# Patient Record
Sex: FEMALE | Race: WHITE | NOT HISPANIC OR LATINO | Employment: FULL TIME
[De-identification: names, ages, dates, MRNs, and addresses within clinical notes are randomized per-mention and may not be internally consistent; named-entity substitution may affect disease eponyms.]

---

## 2017-09-17 ENCOUNTER — HEALTH MAINTENANCE LETTER (OUTPATIENT)
Age: 22
End: 2017-09-17

## 2019-05-07 ENCOUNTER — NURSE TRIAGE (OUTPATIENT)
Dept: NURSING | Facility: CLINIC | Age: 24
End: 2019-05-07

## 2019-05-07 NOTE — TELEPHONE ENCOUNTER
iNlsa calls and says that she needs a copy of her immunization records. RN then gave pt. The phone # to Medical Records and pt. Says that she will call that # now.    Reason for Disposition    General information question, no triage required and triager able to answer question    Additional Information    Negative: [1] Caller is not with the adult (patient) AND [2] reporting urgent symptoms    Negative: Lab result questions    Negative: Medication questions    Negative: Caller can't be reached by phone    Negative: Caller has already spoken to PCP or another triager    Negative: RN needs further essential information from caller in order to complete triage    Negative: Requesting regular office appointment    Negative: [1] Caller requesting NON-URGENT health information AND [2] PCP's office is the best resource    Negative: Health Information question, no triage required and triager able to answer question    Protocols used: INFORMATION ONLY CALL-A-

## 2020-02-19 ENCOUNTER — HOSPITAL ENCOUNTER (EMERGENCY)
Facility: CLINIC | Age: 25
Discharge: HOME | End: 2020-02-19
Attending: EMERGENCY MEDICINE | Admitting: EMERGENCY MEDICINE

## 2020-02-19 VITALS
WEIGHT: 131 LBS | RESPIRATION RATE: 16 BRPM | TEMPERATURE: 98.1 F | HEART RATE: 78 BPM | DIASTOLIC BLOOD PRESSURE: 74 MMHG | BODY MASS INDEX: 20.83 KG/M2 | OXYGEN SATURATION: 100 % | SYSTOLIC BLOOD PRESSURE: 115 MMHG

## 2020-02-19 DIAGNOSIS — K62.5 RECTAL BLEEDING: ICD-10-CM

## 2020-02-19 LAB
BASOPHILS # BLD AUTO: 0 10E9/L (ref 0–0.2)
BASOPHILS NFR BLD AUTO: 0.2 %
DIFFERENTIAL METHOD BLD: NORMAL
EOSINOPHIL # BLD AUTO: 0.1 10E9/L (ref 0–0.7)
EOSINOPHIL NFR BLD AUTO: 0.8 %
ERYTHROCYTE [DISTWIDTH] IN BLOOD BY AUTOMATED COUNT: 11.9 % (ref 10–15)
HCT VFR BLD AUTO: 39.9 % (ref 35–47)
HGB BLD-MCNC: 13.5 G/DL (ref 11.7–15.7)
IMM GRANULOCYTES # BLD: 0 10E9/L (ref 0–0.4)
IMM GRANULOCYTES NFR BLD: 0.2 %
LYMPHOCYTES # BLD AUTO: 2.5 10E9/L (ref 0.8–5.3)
LYMPHOCYTES NFR BLD AUTO: 25.9 %
MCH RBC QN AUTO: 31 PG (ref 26.5–33)
MCHC RBC AUTO-ENTMCNC: 33.8 G/DL (ref 31.5–36.5)
MCV RBC AUTO: 92 FL (ref 78–100)
MONOCYTES # BLD AUTO: 0.5 10E9/L (ref 0–1.3)
MONOCYTES NFR BLD AUTO: 5.3 %
NEUTROPHILS # BLD AUTO: 6.5 10E9/L (ref 1.6–8.3)
NEUTROPHILS NFR BLD AUTO: 67.6 %
NRBC # BLD AUTO: 0 10*3/UL
NRBC BLD AUTO-RTO: 0 /100
PLATELET # BLD AUTO: 279 10E9/L (ref 150–450)
RBC # BLD AUTO: 4.36 10E12/L (ref 3.8–5.2)
WBC # BLD AUTO: 9.6 10E9/L (ref 4–11)

## 2020-02-19 PROCEDURE — 85025 COMPLETE CBC W/AUTO DIFF WBC: CPT | Performed by: EMERGENCY MEDICINE

## 2020-02-19 PROCEDURE — 99284 EMERGENCY DEPT VISIT MOD MDM: CPT | Mod: 25

## 2020-02-19 PROCEDURE — 99283 EMERGENCY DEPT VISIT LOW MDM: CPT | Mod: 25 | Performed by: EMERGENCY MEDICINE

## 2020-02-19 PROCEDURE — 46600 DIAGNOSTIC ANOSCOPY SPX: CPT

## 2020-02-19 PROCEDURE — 46600 DIAGNOSTIC ANOSCOPY SPX: CPT | Mod: Z6 | Performed by: EMERGENCY MEDICINE

## 2020-02-19 ASSESSMENT — ENCOUNTER SYMPTOMS
FEVER: 0
CONSTIPATION: 0
ANAL BLEEDING: 1
RECTAL PAIN: 1

## 2020-02-19 NOTE — DISCHARGE INSTRUCTIONS
Please make an appointment to follow up with [facility] (phone: [phone]).    Proctofoam as directed.    Eat a high-fiber diet and consider fiber supplementation with Metamucil.    Return to the emergency department for high fevers, severe pain, worsening bleeding, or any other problems.    
89 F Presenting with cough, dyspnea found to be profoundly dry with metabolic alkalosis concerning for contraction alkalosis, also found to have leukocytosis concerning for underlying infection as a likely inciting factor, without risk factors for resistant organisms.

## 2020-02-19 NOTE — ED AVS SNAPSHOT
[facility], [place], Emergency Department  [address]  [facility] [address]  Phone:  [phone]  Fax:  [phone]                                    Nilsa Goldman   MRN: [id number]    Department:  [facility], Emergency Department   Date of Visit:  2/19/2020           After Visit Summary Signature Page    I have received my discharge instructions, and my questions have been answered. I have discussed any challenges I see with this plan with the nurse or doctor.    ..........................................................................................................................................  Patient/Patient Representative Signature      ..........................................................................................................................................  Patient Representative Print Name and Relationship to Patient    ..................................................               ................................................  Date                                   Time    ..........................................................................................................................................  Reviewed by Signature/Title    ...................................................              ..............................................  Date                                               Time          22EPIC Rev 08/18

## 2020-02-19 NOTE — ED PROVIDER NOTES
[facility] [facility]    2/19/20       History     Chief Complaint   Patient presents with     Rectal Problem     pt had anal fiussure last year related to allerguic reaction, anus has been irritated for past month and today large amt of blood.     The history is provided by the patient.     Nilsa Goldman is a 24 year old female with a medical history significant for a prior anal fissure (2019) who presents to the Emergency Department for evaluation of anal bleeding and rectal pain with a bowel movement today.  The patient reports that she has had some mild discomfort for the past month with bowel movements, patient has been concerned that she may have another anal fissure.  The patient today had a bowel movement and had an excessive amount of anal bleeding and she noted some rectal pain associated with the bowel movement.  The patient reports that the bleeding was to the amount that she was having her menstrual period again, which she just had.  She denies any fevers and she denies any recent constipation or straining to have a bowel movement.  The patient reports that her symptoms feel similar to when the anal fissure was diagnosed last year, but the pain last year was sharper and more severe.  The patient reports that prior anal fissure was initially treated with home remedies, but this did not resolve the issue, so she was seen by a colorectal specialist and was treated with an ointment.  The patient does not remember the name of this ointment and we do not have access to these records.    I have reviewed the Medications, Allergies, Past Medical and Surgical History, and Social History in the [employer] system.    Past Medical History:   Diagnosis Date     NO ACTIVE PROBLEMS        Past Surgical History:   Procedure Laterality Date     NO HISTORY OF SURGERY         Family History   Problem Relation Age of Onset     Family History Negative Mother         alive 43     Family History  Negative Father         alive 43     Family History Negative Sister         alive 9     Family History Negative Brother         half brother-alive 19 from dad     Family History Negative Maternal Grandmother              Cardiovascular Maternal Grandfather         -heart attck at age of 56     Family History Negative Paternal Grandmother         alive     Heart Disease Paternal Grandmother      Family History Negative Paternal Grandfather         alive     Diabetes No family hx of        Social History     Tobacco Use     Smoking status: Never Smoker     Smokeless tobacco: Never Used     Tobacco comment: mom and dad smokes   Substance Use Topics     Alcohol use: No       No current facility-administered medications for this encounter.      Current Outpatient Medications   Medication     pramoxine 1 % RE foam     NO ACTIVE MEDICATIONS        Allergies   Allergen Reactions     Cephalexin GI Disturbance     Resulted in anal fissure.       Penicillin G      Rash, hives         Review of Systems   Constitutional: Negative for fever.   Gastrointestinal: Positive for anal bleeding and rectal pain. Negative for constipation.   All other systems reviewed and are negative.      Physical Exam   BP: 124/78  Pulse: 78  Temp: 97.8  F (36.6  C)  Resp: 14  Weight: 59.4 kg (131 lb)  SpO2: 98 %      Physical Exam  Vitals signs and nursing note reviewed.   Constitutional:       General: She is not in acute distress.     Appearance: She is well-developed. She is not ill-appearing or toxic-appearing.   HENT:      Head: Normocephalic and atraumatic.   Eyes:      General: No scleral icterus.     Pupils: Pupils are equal, round, and reactive to light.   Neck:      Musculoskeletal: Normal range of motion and neck supple.   Cardiovascular:      Rate and Rhythm: Normal rate.   Pulmonary:      Effort: Pulmonary effort is normal. No respiratory distress.   Abdominal:      General: Abdomen is flat. There is no distension.       Tenderness: There is no abdominal tenderness. There is no guarding or rebound.   Genitourinary:     Rectum: No tenderness, anal fissure, external hemorrhoid or internal hemorrhoid.   Skin:     General: Skin is warm and dry.      Coloration: Skin is not pale.      Findings: No rash.   Neurological:      Mental Status: She is alert and oriented to person, place, and time.      Sensory: No sensory deficit.   Psychiatric:         Behavior: Behavior normal.         ED Course   1:50 PM  The patient was seen and examined by Felton Rodríguez MD in Room ED06.        Procedures        Results for orders placed or performed during the hospital encounter of 02/19/20   CBC with platelets differential     Status: None   Result Value Ref Range    WBC 9.6 4.0 - 11.0 10e9/L    RBC Count 4.36 3.8 - 5.2 10e12/L    Hemoglobin 13.5 11.7 - 15.7 g/dL    Hematocrit 39.9 35.0 - 47.0 %    MCV 92 78 - 100 fl    MCH 31.0 26.5 - 33.0 pg    MCHC 33.8 31.5 - 36.5 g/dL    RDW 11.9 10.0 - 15.0 %    Platelet Count 279 150 - 450 10e9/L    Diff Method Automated Method     % Neutrophils 67.6 %    % Lymphocytes 25.9 %    % Monocytes 5.3 %    % Eosinophils 0.8 %    % Basophils 0.2 %    % Immature Granulocytes 0.2 %    Nucleated RBCs 0 0 /100    Absolute Neutrophil 6.5 1.6 - 8.3 10e9/L    Absolute Lymphocytes 2.5 0.8 - 5.3 10e9/L    Absolute Monocytes 0.5 0.0 - 1.3 10e9/L    Absolute Eosinophils 0.1 0.0 - 0.7 10e9/L    Absolute Basophils 0.0 0.0 - 0.2 10e9/L    Abs Immature Granulocytes 0.0 0 - 0.4 10e9/L    Absolute Nucleated RBC 0.0           Assessments & Plan (with Medical Decision Making)   This patient presented emergency department for one episode of rectal bleeding.  She has a past history of anal fissure.  She is having some anal discomfort but on exam did not have an obvious fissure.  Anoscopy was performed and there was some mucosal irritation but I did not appreciate any active bleeding or obvious large fissure.  Abdominal exam is benign,  hemoglobin is stable and vital signs are within normal limits all decreasing suspicion for significant GI bleed.  She has had no episodes of bleeding in the emergency department and no blood was noted on rectal exam.  This point time I am comfortable starting the patient on treatment with Proctofoam for the anal discomfort and having her follow-up with the colorectal specialist that she had seen before.  She was also provided with the phone number for the colorectal clinic at the [facility] and told to return should symptoms worsen or she develop any other symptoms in the interim.  This part of the medical record was transcribed by Brendan Owusu Medical Scribe, from a dictation done by Remi Rodríguez MD.     I have reviewed the nursing notes.    I have reviewed the findings, diagnosis, plan and need for follow up with the patient.    Discharge Medication List as of 2/19/2020  3:24 PM      START taking these medications    Details   pramoxine 1 % RE foam Place 1 g rectally every 4 hours as needed (rectal irritation and after bowel movements)Disp-60 g, R-0Local Print             Final diagnoses:   Rectal bleeding     IMauricio, am serving as a trained medical scribe to document services personally performed by Felton Rodríguez MD, based on the provider's statements to me.     I, Felton Rodríguez MD, was physically present and have reviewed and verified the accuracy of this note documented by Mauricio Lee.    2/19/2020   [facility], EMERGENCY DEPARTMENT     Felton Rodríguez MD  02/21/20 4418

## 2021-12-26 ENCOUNTER — ANESTHESIA EVENT (OUTPATIENT)
Dept: OBGYN | Facility: CLINIC | Age: 26
End: 2021-12-26
Payer: OTHER GOVERNMENT

## 2021-12-26 ENCOUNTER — ANESTHESIA (OUTPATIENT)
Dept: OBGYN | Facility: CLINIC | Age: 26
End: 2021-12-26
Payer: OTHER GOVERNMENT

## 2021-12-26 ENCOUNTER — HOSPITAL ENCOUNTER (INPATIENT)
Facility: CLINIC | Age: 26
LOS: 3 days | Discharge: HOME HEALTH | End: 2021-12-29
Attending: ADVANCED PRACTICE MIDWIFE | Admitting: ADVANCED PRACTICE MIDWIFE
Payer: OTHER GOVERNMENT

## 2021-12-26 DIAGNOSIS — Z98.891 S/P CESAREAN SECTION: Primary | ICD-10-CM

## 2021-12-26 PROBLEM — Z34.90 PREGNANCY WITH PRENATAL CARE ELSEWHERE: Status: ACTIVE | Noted: 2021-12-26

## 2021-12-26 LAB
ABO/RH(D): ABNORMAL
ANTIBODY ID: NORMAL
ANTIBODY SCREEN: POSITIVE
BASOPHILS # BLD AUTO: 0 10E3/UL (ref 0–0.2)
BASOPHILS NFR BLD AUTO: 0 %
EOSINOPHIL # BLD AUTO: 0.1 10E3/UL (ref 0–0.7)
EOSINOPHIL NFR BLD AUTO: 1 %
ERYTHROCYTE [DISTWIDTH] IN BLOOD BY AUTOMATED COUNT: 13.2 % (ref 10–15)
GLUCOSE BLDC GLUCOMTR-MCNC: 134 MG/DL (ref 70–99)
GLUCOSE BLDC GLUCOMTR-MCNC: 84 MG/DL (ref 70–99)
HCT VFR BLD AUTO: 38 % (ref 35–47)
HGB BLD-MCNC: 12.4 G/DL (ref 11.7–15.7)
IMM GRANULOCYTES # BLD: 0.1 10E3/UL
IMM GRANULOCYTES NFR BLD: 1 %
LYMPHOCYTES # BLD AUTO: 2.3 10E3/UL (ref 0.8–5.3)
LYMPHOCYTES NFR BLD AUTO: 17 %
MCH RBC QN AUTO: 29.9 PG (ref 26.5–33)
MCHC RBC AUTO-ENTMCNC: 32.6 G/DL (ref 31.5–36.5)
MCV RBC AUTO: 92 FL (ref 78–100)
MONOCYTES # BLD AUTO: 0.6 10E3/UL (ref 0–1.3)
MONOCYTES NFR BLD AUTO: 5 %
NEUTROPHILS # BLD AUTO: 10.2 10E3/UL (ref 1.6–8.3)
NEUTROPHILS NFR BLD AUTO: 76 %
NRBC # BLD AUTO: 0 10E3/UL
NRBC BLD AUTO-RTO: 0 /100
PLATELET # BLD AUTO: 279 10E3/UL (ref 150–450)
RBC # BLD AUTO: 4.15 10E6/UL (ref 3.8–5.2)
SARS-COV-2 RNA RESP QL NAA+PROBE: NEGATIVE
SPECIMEN EXPIRATION DATE: ABNORMAL
SPECIMEN EXPIRATION DATE: NORMAL
WBC # BLD AUTO: 13.3 10E3/UL (ref 4–11)

## 2021-12-26 PROCEDURE — 86780 TREPONEMA PALLIDUM: CPT | Performed by: ADVANCED PRACTICE MIDWIFE

## 2021-12-26 PROCEDURE — 86920 COMPATIBILITY TEST SPIN: CPT | Performed by: STUDENT IN AN ORGANIZED HEALTH CARE EDUCATION/TRAINING PROGRAM

## 2021-12-26 PROCEDURE — 120N000002 HC R&B MED SURG/OB UMMC

## 2021-12-26 PROCEDURE — 86870 RBC ANTIBODY IDENTIFICATION: CPT | Performed by: ADVANCED PRACTICE MIDWIFE

## 2021-12-26 PROCEDURE — 250N000011 HC RX IP 250 OP 636

## 2021-12-26 PROCEDURE — 250N000013 HC RX MED GY IP 250 OP 250 PS 637: Performed by: REGISTERED NURSE

## 2021-12-26 PROCEDURE — 86850 RBC ANTIBODY SCREEN: CPT | Performed by: ADVANCED PRACTICE MIDWIFE

## 2021-12-26 PROCEDURE — 87635 SARS-COV-2 COVID-19 AMP PRB: CPT | Performed by: ADVANCED PRACTICE MIDWIFE

## 2021-12-26 PROCEDURE — 250N000009 HC RX 250: Performed by: ADVANCED PRACTICE MIDWIFE

## 2021-12-26 PROCEDURE — 250N000009 HC RX 250: Performed by: ANESTHESIOLOGY

## 2021-12-26 PROCEDURE — 258N000003 HC RX IP 258 OP 636: Performed by: ADVANCED PRACTICE MIDWIFE

## 2021-12-26 PROCEDURE — 85025 COMPLETE CBC W/AUTO DIFF WBC: CPT | Performed by: ADVANCED PRACTICE MIDWIFE

## 2021-12-26 RX ORDER — FENTANYL CITRATE-0.9 % NACL/PF 10 MCG/ML
100 PLASTIC BAG, INJECTION (ML) INTRAVENOUS EVERY 5 MIN PRN
Status: DISCONTINUED | OUTPATIENT
Start: 2021-12-26 | End: 2021-12-27 | Stop reason: HOSPADM

## 2021-12-26 RX ORDER — CARBOPROST TROMETHAMINE 250 UG/ML
250 INJECTION, SOLUTION INTRAMUSCULAR
Status: DISCONTINUED | OUTPATIENT
Start: 2021-12-26 | End: 2021-12-27 | Stop reason: HOSPADM

## 2021-12-26 RX ORDER — PROCHLORPERAZINE MALEATE 10 MG
10 TABLET ORAL EVERY 6 HOURS PRN
Status: DISCONTINUED | OUTPATIENT
Start: 2021-12-26 | End: 2021-12-27 | Stop reason: HOSPADM

## 2021-12-26 RX ORDER — METOCLOPRAMIDE HYDROCHLORIDE 5 MG/ML
10 INJECTION INTRAMUSCULAR; INTRAVENOUS EVERY 6 HOURS PRN
Status: DISCONTINUED | OUTPATIENT
Start: 2021-12-26 | End: 2021-12-27 | Stop reason: HOSPADM

## 2021-12-26 RX ORDER — NALBUPHINE HYDROCHLORIDE 10 MG/ML
2.5-5 INJECTION, SOLUTION INTRAMUSCULAR; INTRAVENOUS; SUBCUTANEOUS EVERY 6 HOURS PRN
Status: DISCONTINUED | OUTPATIENT
Start: 2021-12-26 | End: 2021-12-27

## 2021-12-26 RX ORDER — FENTANYL CITRATE-0.9 % NACL/PF 10 MCG/ML
PLASTIC BAG, INJECTION (ML) INTRAVENOUS
Status: DISCONTINUED
Start: 2021-12-26 | End: 2021-12-27 | Stop reason: HOSPADM

## 2021-12-26 RX ORDER — PRENATAL VIT/IRON FUM/FOLIC AC 27MG-0.8MG
1 TABLET ORAL DAILY
COMMUNITY
End: 2023-11-16

## 2021-12-26 RX ORDER — TRANEXAMIC ACID 10 MG/ML
1 INJECTION, SOLUTION INTRAVENOUS EVERY 30 MIN PRN
Status: DISCONTINUED | OUTPATIENT
Start: 2021-12-26 | End: 2021-12-27 | Stop reason: HOSPADM

## 2021-12-26 RX ORDER — NALOXONE HYDROCHLORIDE 0.4 MG/ML
0.4 INJECTION, SOLUTION INTRAMUSCULAR; INTRAVENOUS; SUBCUTANEOUS
Status: DISCONTINUED | OUTPATIENT
Start: 2021-12-26 | End: 2021-12-27 | Stop reason: HOSPADM

## 2021-12-26 RX ORDER — CALCIUM CARBONATE 500 MG/1
500 TABLET, CHEWABLE ORAL 3 TIMES DAILY PRN
Status: DISCONTINUED | OUTPATIENT
Start: 2021-12-26 | End: 2021-12-27

## 2021-12-26 RX ORDER — FENTANYL/ROPIVACAINE/NS/PF 2MCG/ML-.1
PLASTIC BAG, INJECTION (ML) EPIDURAL
Status: COMPLETED
Start: 2021-12-26 | End: 2021-12-26

## 2021-12-26 RX ORDER — OXYTOCIN/0.9 % SODIUM CHLORIDE 30/500 ML
340 PLASTIC BAG, INJECTION (ML) INTRAVENOUS CONTINUOUS PRN
Status: DISCONTINUED | OUTPATIENT
Start: 2021-12-26 | End: 2021-12-27 | Stop reason: HOSPADM

## 2021-12-26 RX ORDER — FENTANYL CITRATE 50 UG/ML
50-100 INJECTION, SOLUTION INTRAMUSCULAR; INTRAVENOUS
Status: DISCONTINUED | OUTPATIENT
Start: 2021-12-26 | End: 2021-12-27 | Stop reason: HOSPADM

## 2021-12-26 RX ORDER — SODIUM CHLORIDE, SODIUM LACTATE, POTASSIUM CHLORIDE, CALCIUM CHLORIDE 600; 310; 30; 20 MG/100ML; MG/100ML; MG/100ML; MG/100ML
INJECTION, SOLUTION INTRAVENOUS CONTINUOUS PRN
Status: DISCONTINUED | OUTPATIENT
Start: 2021-12-26 | End: 2021-12-27 | Stop reason: HOSPADM

## 2021-12-26 RX ORDER — ONDANSETRON 4 MG/1
4 TABLET, ORALLY DISINTEGRATING ORAL EVERY 6 HOURS PRN
Status: DISCONTINUED | OUTPATIENT
Start: 2021-12-26 | End: 2021-12-27 | Stop reason: HOSPADM

## 2021-12-26 RX ORDER — KETOROLAC TROMETHAMINE 30 MG/ML
30 INJECTION, SOLUTION INTRAMUSCULAR; INTRAVENOUS
Status: DISCONTINUED | OUTPATIENT
Start: 2021-12-26 | End: 2021-12-27

## 2021-12-26 RX ORDER — OXYTOCIN/0.9 % SODIUM CHLORIDE 30/500 ML
100-340 PLASTIC BAG, INJECTION (ML) INTRAVENOUS CONTINUOUS PRN
Status: DISCONTINUED | OUTPATIENT
Start: 2021-12-26 | End: 2021-12-27

## 2021-12-26 RX ORDER — NALOXONE HYDROCHLORIDE 0.4 MG/ML
0.2 INJECTION, SOLUTION INTRAMUSCULAR; INTRAVENOUS; SUBCUTANEOUS
Status: DISCONTINUED | OUTPATIENT
Start: 2021-12-26 | End: 2021-12-27 | Stop reason: HOSPADM

## 2021-12-26 RX ORDER — OXYTOCIN 10 [USP'U]/ML
10 INJECTION, SOLUTION INTRAMUSCULAR; INTRAVENOUS
Status: DISCONTINUED | OUTPATIENT
Start: 2021-12-26 | End: 2021-12-27

## 2021-12-26 RX ORDER — IBUPROFEN 600 MG/1
600 TABLET, FILM COATED ORAL
Status: DISCONTINUED | OUTPATIENT
Start: 2021-12-26 | End: 2021-12-27

## 2021-12-26 RX ORDER — PROCHLORPERAZINE 25 MG
25 SUPPOSITORY, RECTAL RECTAL EVERY 12 HOURS PRN
Status: DISCONTINUED | OUTPATIENT
Start: 2021-12-26 | End: 2021-12-27 | Stop reason: HOSPADM

## 2021-12-26 RX ORDER — ONDANSETRON 2 MG/ML
4 INJECTION INTRAMUSCULAR; INTRAVENOUS EVERY 6 HOURS PRN
Status: DISCONTINUED | OUTPATIENT
Start: 2021-12-26 | End: 2021-12-27 | Stop reason: HOSPADM

## 2021-12-26 RX ORDER — MISOPROSTOL 200 UG/1
800 TABLET ORAL
Status: DISCONTINUED | OUTPATIENT
Start: 2021-12-26 | End: 2021-12-27 | Stop reason: HOSPADM

## 2021-12-26 RX ORDER — FENTANYL/ROPIVACAINE/NS/PF 2MCG/ML-.1
PLASTIC BAG, INJECTION (ML) EPIDURAL
Status: DISCONTINUED | OUTPATIENT
Start: 2021-12-26 | End: 2021-12-27 | Stop reason: HOSPADM

## 2021-12-26 RX ORDER — METHYLERGONOVINE MALEATE 0.2 MG/ML
200 INJECTION INTRAVENOUS
Status: DISCONTINUED | OUTPATIENT
Start: 2021-12-26 | End: 2021-12-27 | Stop reason: HOSPADM

## 2021-12-26 RX ORDER — OXYTOCIN/0.9 % SODIUM CHLORIDE 30/500 ML
1-24 PLASTIC BAG, INJECTION (ML) INTRAVENOUS CONTINUOUS
Status: DISCONTINUED | OUTPATIENT
Start: 2021-12-26 | End: 2021-12-27 | Stop reason: HOSPADM

## 2021-12-26 RX ORDER — MISOPROSTOL 200 UG/1
400 TABLET ORAL
Status: DISCONTINUED | OUTPATIENT
Start: 2021-12-26 | End: 2021-12-27 | Stop reason: HOSPADM

## 2021-12-26 RX ORDER — LIDOCAINE 40 MG/G
CREAM TOPICAL
Status: DISCONTINUED | OUTPATIENT
Start: 2021-12-26 | End: 2021-12-27 | Stop reason: HOSPADM

## 2021-12-26 RX ORDER — OXYTOCIN 10 [USP'U]/ML
10 INJECTION, SOLUTION INTRAMUSCULAR; INTRAVENOUS
Status: DISCONTINUED | OUTPATIENT
Start: 2021-12-26 | End: 2021-12-27 | Stop reason: HOSPADM

## 2021-12-26 RX ORDER — METOCLOPRAMIDE 10 MG/1
10 TABLET ORAL EVERY 6 HOURS PRN
Status: DISCONTINUED | OUTPATIENT
Start: 2021-12-26 | End: 2021-12-27 | Stop reason: HOSPADM

## 2021-12-26 RX ADMIN — Medication: at 21:31

## 2021-12-26 RX ADMIN — CALCIUM CARBONATE (ANTACID) CHEW TAB 500 MG 500 MG: 500 CHEW TAB at 21:39

## 2021-12-26 RX ADMIN — Medication 2 MILLI-UNITS/MIN: at 13:44

## 2021-12-26 RX ADMIN — SODIUM CHLORIDE, POTASSIUM CHLORIDE, SODIUM LACTATE AND CALCIUM CHLORIDE 500 ML: 600; 310; 30; 20 INJECTION, SOLUTION INTRAVENOUS at 20:12

## 2021-12-26 RX ADMIN — Medication 4 ML: at 21:10

## 2021-12-26 RX ADMIN — SODIUM CHLORIDE, POTASSIUM CHLORIDE, SODIUM LACTATE AND CALCIUM CHLORIDE 125 ML/HR: 600; 310; 30; 20 INJECTION, SOLUTION INTRAVENOUS at 13:50

## 2021-12-26 RX ADMIN — Medication 3 ML: at 21:05

## 2021-12-26 RX ADMIN — SODIUM CHLORIDE, POTASSIUM CHLORIDE, SODIUM LACTATE AND CALCIUM CHLORIDE: 600; 310; 30; 20 INJECTION, SOLUTION INTRAVENOUS at 22:02

## 2021-12-26 NOTE — PLAN OF CARE
Induction of Labor Admit Note  Nilsa Goldman  MRN: [id number]  Gestational Age: 41w6d      Nilsa Goldman presents for induction of labor for prolonged rupture of membranes and post dates.  Patient complains of infrequent contractions at this time, leaking small amounts of greenish/brown fluid and denies bleeding.   Past Medical History:   Diagnosis Date    NO ACTIVE PROBLEMS      Induction Plan: oxytocin and continuous monitoring    Imtiaz Sapp CNM notified of patient's arrival and condition.   Oriented patient to surroundings. Call light within reach.     Oxytocin started at 1345 and will increase per order set.     Per Imtiaz Sapp CNM, records indicate that pts' BG were slightly elevated per [facility] parameters so RNs will check BG 2hrs PP X3. Will monitor patient closely and will notify provider with any concerns.

## 2021-12-26 NOTE — H&P
ADMIT NOTE  =================  41w6d    Nilsa Goldman is a 26 year old female with an Patient's last menstrual period was 2021. and Estimated Date of Delivery: Dec 13, 2021 [facility] at 12:49 PM with for induction of labor.  Indication: SROM without labor, ROM x 39 hours.     HPI  ================  Patient with planned Out of Hospital Birth is transferring by car to the hospital for prolonged ROM with no labor. Pt with SROM at 2200 on 21, light meconium.  Irregular contractions, occasionally uncomfortable. Now reports uterine cramping.      Patient has been seen by [facility] for prenatal care.  Transferring midwife name(s),/birth center & phone number: [facility]  Midwife here supporting patient: no  Records received, reviewed and scanned into chart. yes  M Tapit SBAR transfer tool was used: 50%    Contractions- cramping  Fetal movement- active  ROM- yes moderate, meconium. SROM @ 2200 on 21   Vaginal bleeding- none  GBS- negative  FOB- is involved, Igor  Other labor support- mother,     Weight gain- 183 - 132 lbs, Total weight gain- 51 lbs  Height- 68  BMI- 20  First prenatal visit at 6 weeks, Total visits- 13  Initiated at [facility] at 6 weeks for 2 visits, JUAN ANTONIO to [place] at 13w    PROBLEM LIST  =================  Patient Active Problem List    Diagnosis Date Noted     Labor and delivery, indication for care 2021     Priority: Medium     Pregnancy with prenatal care elsewhere 2021     Priority: Medium     Patient with planned Out of Hospital Birth is transferring by car to the hospital for PROM  Patient has been seen by [facility] for prenatal care.  Transferring midwife name(s),/birth center & phone number: [facility], [phone]  Midwife here supporting patient: no  Records received, reviewed and scanned into chart. yes  M Tapit SBAR transfer tool was used: 50%         Undersocialized conduct disorder, aggressive type  2008     Priority: Medium     anger issues  Problem list name updated by automated process. Provider to review       HISTORIES  ============  Allergies   Allergen Reactions     Cephalexin GI Disturbance     Resulted in anal fissure.       Penicillin G      Rash, hives     Past Medical History:   Diagnosis Date     NO ACTIVE PROBLEMS      Past Surgical History:   Procedure Laterality Date     NO HISTORY OF SURGERY       WISDOM TOOTH EXTRACTION Bilateral    .  Family History   Problem Relation Age of Onset     Family History Negative Mother         alive 43     Family History Negative Father         alive 43     Family History Negative Sister         alive 9     Family History Negative Brother         half brother-alive 19 from dad     Family History Negative Maternal Grandmother              Cardiovascular Maternal Grandfather         -heart attck at age of 56     Family History Negative Paternal Grandmother         alive     Heart Disease Paternal Grandmother      Family History Negative Paternal Grandfather         alive     Diabetes No family hx of      Social History     Tobacco Use     Smoking status: Never Smoker     Smokeless tobacco: Never Used     Tobacco comment: mom and dad smokes   Substance Use Topics     Alcohol use: No     OB History    Para Term  AB Living   1 0 0 0 0 0   SAB IAB Ectopic Multiple Live Births   0 0 0 0 0      # Outcome Date GA Lbr Jose/2nd Weight Sex Delivery Anes PTL Lv   1 Current               LABS:   ===========  Prenatal Labs reviewed per transfer records:   Rhogam indicated and given on 21  ABO/ RH: neg  Rubella immune   HBsAg: negative   HIV: negative   RPR: NR   GCT: 134  (within normal range for BC)  GBS: neg, collected 21  HEP C: neg    Lab Results   Component Value Date    HGB 12.4 2021     Other labs:  Results for orders placed or performed during the hospital encounter of 21 (from the past 24 hour(s))   ABO/Rh type and  screen    Narrative    The following orders were created for panel order ABO/Rh type and screen.  Procedure                               Abnormality         Status                     ---------                               -----------         ------                     Adult Type and Screen[[id number]]                            In process                   Please view results for these tests on the individual orders.   CBC with platelets differential    Narrative    The following orders were created for panel order CBC with platelets differential.  Procedure                               Abnormality         Status                     ---------                               -----------         ------                     CBC with platelets and d...[[id number]]  Abnormal            Final result                 Please view results for these tests on the individual orders.   CBC with platelets and differential   Result Value Ref Range    WBC Count 13.3 (H) 4.0 - 11.0 10e3/uL    RBC Count 4.15 3.80 - 5.20 10e6/uL    Hemoglobin 12.4 11.7 - 15.7 g/dL    Hematocrit 38.0 35.0 - 47.0 %    MCV 92 78 - 100 fL    MCH 29.9 26.5 - 33.0 pg    MCHC 32.6 31.5 - 36.5 g/dL    RDW 13.2 10.0 - 15.0 %    Platelet Count 279 150 - 450 10e3/uL    % Neutrophils 76 %    % Lymphocytes 17 %    % Monocytes 5 %    % Eosinophils 1 %    % Basophils 0 %    % Immature Granulocytes 1 %    NRBCs per 100 WBC 0 <1 /100    Absolute Neutrophils 10.2 (H) 1.6 - 8.3 10e3/uL    Absolute Lymphocytes 2.3 0.8 - 5.3 10e3/uL    Absolute Monocytes 0.6 0.0 - 1.3 10e3/uL    Absolute Eosinophils 0.1 0.0 - 0.7 10e3/uL    Absolute Basophils 0.0 0.0 - 0.2 10e3/uL    Absolute Immature Granulocytes 0.1 <=0.4 10e3/uL    Absolute NRBCs 0.0 10e3/uL       ULTRASOUND  =============  4/22/21 dating us c/w LMP (BRANDY 12/13/21)  8/5/21, result normal level II     ROS  =========  Pt denies significant respiratory, cardiovacular, GI, or muscular/skeletalcomplaints.    See RN data base  ROS.     PHYSICAL EXAM:  ===============  /75   Temp 97.4  F (36.3  C) (Oral)   Resp 18   LMP 03/08/2021   General appearance: comfortable  GENERAL APPEARANCE: healthy, alert and no distress  RESP: normal respiratory effort  CV: regular rates and rhythm, normal S1 S2, no S3 or S4 and no murmur,and no varicosities  ABDOMEN:  soft, nontender, no epigastric pain  SKIN: no suspicious lesions or rashes  NEURO: Denies headache, blurred vision, other vision changes  PSYCH: mentation appears normal. and affect normal/bright  Legs: Reflexes normal bilaterally     Abdomen: gravid, vertex fetus per Leopold's, non-tender between contractions.   Cephalic presentation confirmed by BSUS  EFW-  7.5-8 lbs.   CONTRACTIONS: every 3-4 minutes, mild and cramping  FETAL HEART TONES: continuous EFM- baseline 130 with moderate variability and positive accelerations. No decelerations.  PELVIC EXAM: 1/70/-2, soft, posterior per [facility] midwife  ROSA SCORE: 6  BLOODY SHOW: no   ROM:yes small, meconium. SROM @ 2200 on 12/24/21  FLUID: scant, yellow-tinged  ROMPLUS: not done    ASSESSMENT:  ==============  IUP @ 41w6d admitted for induction of labor.  Indication: SROM without labor, ROM x 39 hours   NST NOT DONE  Fetal Heart Rate - category one  GBS- negative     PLAN:  ===========  Admit - see IP orders.  Pain medication options of nitrous oxide, fentanyl IV and epidural anesthesia reviewed with pt. Pt is interested in non-pharmacologic options. Aware she can ask for pain medication if needed or desired.  Cervical ripening with misoprostol risks and benefits vs. labor induction with Pitocin.  Pt desires Pitocin at this time.  Ambulation, hydration, position changes, birthing ball and tub options to facilitate labor reviewed with pt .  Reevaluate in 2-4 hours LORE Reyes CNM

## 2021-12-26 NOTE — PROVIDER NOTIFICATION
12/26/21 1500   Fetal Assessment   Fetal HR Assessment Method elyssa   Fetal HR (beats/min) 130   Fetal Heart Baseline Rate normal range   Fetal HR Variability moderate (amplitude range 6 to 25 bpm)   Fetal HR Accelerations greater than/equal to 15 bpm;lasting at least 15 seconds   Fetal HR Decelerations late  (x1)   1 decel x90 seconds at 1457 that was related to pt leaving her room and monitor not picking up correctly.

## 2021-12-27 ENCOUNTER — ANCILLARY PROCEDURE (OUTPATIENT)
Dept: ULTRASOUND IMAGING | Facility: CLINIC | Age: 26
End: 2021-12-27
Attending: ANESTHESIOLOGY
Payer: OTHER GOVERNMENT

## 2021-12-27 LAB
BASOPHILS # BLD AUTO: 0.1 10E3/UL (ref 0–0.2)
BASOPHILS NFR BLD AUTO: 0 %
BLD PROD TYP BPU: NORMAL
BLD PROD TYP BPU: NORMAL
BLOOD COMPONENT TYPE: NORMAL
BLOOD COMPONENT TYPE: NORMAL
CODING SYSTEM: NORMAL
CODING SYSTEM: NORMAL
CROSSMATCH: NORMAL
CROSSMATCH: NORMAL
EOSINOPHIL # BLD AUTO: 0 10E3/UL (ref 0–0.7)
EOSINOPHIL NFR BLD AUTO: 0 %
ERYTHROCYTE [DISTWIDTH] IN BLOOD BY AUTOMATED COUNT: 13.7 % (ref 10–15)
GLUCOSE BLDC GLUCOMTR-MCNC: 124 MG/DL (ref 70–99)
HCT VFR BLD AUTO: 31 % (ref 35–47)
HGB BLD-MCNC: 10 G/DL (ref 11.7–15.7)
IMM GRANULOCYTES # BLD: 0.1 10E3/UL
IMM GRANULOCYTES NFR BLD: 0 %
LYMPHOCYTES # BLD AUTO: 1.4 10E3/UL (ref 0.8–5.3)
LYMPHOCYTES NFR BLD AUTO: 7 %
MCH RBC QN AUTO: 30.4 PG (ref 26.5–33)
MCHC RBC AUTO-ENTMCNC: 32.3 G/DL (ref 31.5–36.5)
MCV RBC AUTO: 94 FL (ref 78–100)
MONOCYTES # BLD AUTO: 0.7 10E3/UL (ref 0–1.3)
MONOCYTES NFR BLD AUTO: 4 %
NEUTROPHILS # BLD AUTO: 17.3 10E3/UL (ref 1.6–8.3)
NEUTROPHILS NFR BLD AUTO: 89 %
NRBC # BLD AUTO: 0 10E3/UL
NRBC BLD AUTO-RTO: 0 /100
PLATELET # BLD AUTO: 192 10E3/UL (ref 150–450)
RBC # BLD AUTO: 3.29 10E6/UL (ref 3.8–5.2)
T PALLIDUM AB SER QL: NONREACTIVE
UNIT ABO/RH: NORMAL
UNIT ABO/RH: NORMAL
UNIT NUMBER: NORMAL
UNIT NUMBER: NORMAL
UNIT STATUS: NORMAL
UNIT STATUS: NORMAL
UNIT TYPE ISBT: 600
UNIT TYPE ISBT: 600
WBC # BLD AUTO: 19.5 10E3/UL (ref 4–11)

## 2021-12-27 PROCEDURE — 258N000003 HC RX IP 258 OP 636: Performed by: ADVANCED PRACTICE MIDWIFE

## 2021-12-27 PROCEDURE — 250N000011 HC RX IP 250 OP 636: Performed by: OBSTETRICS & GYNECOLOGY

## 2021-12-27 PROCEDURE — 250N000013 HC RX MED GY IP 250 OP 250 PS 637: Performed by: STUDENT IN AN ORGANIZED HEALTH CARE EDUCATION/TRAINING PROGRAM

## 2021-12-27 PROCEDURE — 250N000009 HC RX 250: Performed by: ANESTHESIOLOGY

## 2021-12-27 PROCEDURE — 272N000001 HC OR GENERAL SUPPLY STERILE: Performed by: OBSTETRICS & GYNECOLOGY

## 2021-12-27 PROCEDURE — 250N000011 HC RX IP 250 OP 636: Performed by: STUDENT IN AN ORGANIZED HEALTH CARE EDUCATION/TRAINING PROGRAM

## 2021-12-27 PROCEDURE — 250N000011 HC RX IP 250 OP 636: Performed by: NURSE ANESTHETIST, CERTIFIED REGISTERED

## 2021-12-27 PROCEDURE — 59514 CESAREAN DELIVERY ONLY: CPT | Mod: GC | Performed by: OBSTETRICS & GYNECOLOGY

## 2021-12-27 PROCEDURE — 85025 COMPLETE CBC W/AUTO DIFF WBC: CPT | Performed by: STUDENT IN AN ORGANIZED HEALTH CARE EDUCATION/TRAINING PROGRAM

## 2021-12-27 PROCEDURE — 258N000003 HC RX IP 258 OP 636: Performed by: NURSE ANESTHETIST, CERTIFIED REGISTERED

## 2021-12-27 PROCEDURE — 250N000011 HC RX IP 250 OP 636: Performed by: ANESTHESIOLOGY

## 2021-12-27 PROCEDURE — 360N000076 HC SURGERY LEVEL 3, PER MIN: Performed by: OBSTETRICS & GYNECOLOGY

## 2021-12-27 PROCEDURE — 250N000009 HC RX 250: Performed by: OBSTETRICS & GYNECOLOGY

## 2021-12-27 PROCEDURE — 250N000009 HC RX 250: Performed by: STUDENT IN AN ORGANIZED HEALTH CARE EDUCATION/TRAINING PROGRAM

## 2021-12-27 PROCEDURE — 250N000013 HC RX MED GY IP 250 OP 250 PS 637: Performed by: OBSTETRICS & GYNECOLOGY

## 2021-12-27 PROCEDURE — 250N000013 HC RX MED GY IP 250 OP 250 PS 637: Performed by: REGISTERED NURSE

## 2021-12-27 PROCEDURE — 93010 ELECTROCARDIOGRAM REPORT: CPT | Performed by: INTERNAL MEDICINE

## 2021-12-27 PROCEDURE — 258N000003 HC RX IP 258 OP 636: Performed by: STUDENT IN AN ORGANIZED HEALTH CARE EDUCATION/TRAINING PROGRAM

## 2021-12-27 PROCEDURE — 370N000017 HC ANESTHESIA TECHNICAL FEE, PER MIN: Performed by: OBSTETRICS & GYNECOLOGY

## 2021-12-27 PROCEDURE — 36415 COLL VENOUS BLD VENIPUNCTURE: CPT | Performed by: STUDENT IN AN ORGANIZED HEALTH CARE EDUCATION/TRAINING PROGRAM

## 2021-12-27 PROCEDURE — 120N000002 HC R&B MED SURG/OB UMMC

## 2021-12-27 PROCEDURE — C9290 INJ, BUPIVACAINE LIPOSOME: HCPCS | Performed by: ANESTHESIOLOGY

## 2021-12-27 PROCEDURE — 271N000001 HC OR GENERAL SUPPLY NON-STERILE: Performed by: OBSTETRICS & GYNECOLOGY

## 2021-12-27 PROCEDURE — 710N000010 HC RECOVERY PHASE 1, LEVEL 2, PER MIN: Performed by: OBSTETRICS & GYNECOLOGY

## 2021-12-27 PROCEDURE — 99207 PR NO BILLABLE SERVICE THIS VISIT: CPT | Mod: GC | Performed by: OBSTETRICS & GYNECOLOGY

## 2021-12-27 RX ORDER — NALOXONE HYDROCHLORIDE 1 MG/ML
0.2 INJECTION INTRAMUSCULAR; INTRAVENOUS; SUBCUTANEOUS
Status: DISCONTINUED | OUTPATIENT
Start: 2021-12-27 | End: 2021-12-29 | Stop reason: HOSPADM

## 2021-12-27 RX ORDER — LIDOCAINE HCL/EPINEPHRINE/PF 2%-1:200K
VIAL (ML) INJECTION PRN
Status: DISCONTINUED | OUTPATIENT
Start: 2021-12-27 | End: 2021-12-27

## 2021-12-27 RX ORDER — MISOPROSTOL 200 UG/1
TABLET ORAL
Status: DISCONTINUED
Start: 2021-12-27 | End: 2021-12-27 | Stop reason: HOSPADM

## 2021-12-27 RX ORDER — CARBOPROST TROMETHAMINE 250 UG/ML
250 INJECTION, SOLUTION INTRAMUSCULAR
Status: DISCONTINUED | OUTPATIENT
Start: 2021-12-27 | End: 2021-12-27 | Stop reason: HOSPADM

## 2021-12-27 RX ORDER — ONDANSETRON 2 MG/ML
INJECTION INTRAMUSCULAR; INTRAVENOUS PRN
Status: DISCONTINUED | OUTPATIENT
Start: 2021-12-27 | End: 2021-12-27

## 2021-12-27 RX ORDER — SODIUM CHLORIDE, SODIUM LACTATE, POTASSIUM CHLORIDE, CALCIUM CHLORIDE 600; 310; 30; 20 MG/100ML; MG/100ML; MG/100ML; MG/100ML
INJECTION, SOLUTION INTRAVENOUS CONTINUOUS
Status: DISCONTINUED | OUTPATIENT
Start: 2021-12-27 | End: 2021-12-27 | Stop reason: HOSPADM

## 2021-12-27 RX ORDER — BUPIVACAINE HYDROCHLORIDE 2.5 MG/ML
INJECTION, SOLUTION EPIDURAL; INFILTRATION; INTRACAUDAL
Status: DISCONTINUED | OUTPATIENT
Start: 2021-12-27 | End: 2021-12-27

## 2021-12-27 RX ORDER — FENTANYL CITRATE 50 UG/ML
25 INJECTION, SOLUTION INTRAMUSCULAR; INTRAVENOUS EVERY 5 MIN PRN
Status: DISCONTINUED | OUTPATIENT
Start: 2021-12-27 | End: 2021-12-27 | Stop reason: HOSPADM

## 2021-12-27 RX ORDER — OXYCODONE HYDROCHLORIDE 5 MG/1
5 TABLET ORAL EVERY 4 HOURS PRN
Status: DISCONTINUED | OUTPATIENT
Start: 2021-12-27 | End: 2021-12-27

## 2021-12-27 RX ORDER — TRANEXAMIC ACID 10 MG/ML
1 INJECTION, SOLUTION INTRAVENOUS EVERY 30 MIN PRN
Status: DISCONTINUED | OUTPATIENT
Start: 2021-12-27 | End: 2021-12-27 | Stop reason: HOSPADM

## 2021-12-27 RX ORDER — HYDROMORPHONE HCL IN WATER/PF 6 MG/30 ML
0.2 PATIENT CONTROLLED ANALGESIA SYRINGE INTRAVENOUS EVERY 5 MIN PRN
Status: DISCONTINUED | OUTPATIENT
Start: 2021-12-27 | End: 2021-12-27 | Stop reason: HOSPADM

## 2021-12-27 RX ORDER — METHYLERGONOVINE MALEATE 0.2 MG/ML
200 INJECTION INTRAVENOUS
Status: DISCONTINUED | OUTPATIENT
Start: 2021-12-27 | End: 2021-12-27 | Stop reason: HOSPADM

## 2021-12-27 RX ORDER — CITRIC ACID/SODIUM CITRATE 334-500MG
30 SOLUTION, ORAL ORAL
Status: COMPLETED | OUTPATIENT
Start: 2021-12-27 | End: 2021-12-27

## 2021-12-27 RX ORDER — OXYTOCIN/0.9 % SODIUM CHLORIDE 30/500 ML
100-340 PLASTIC BAG, INJECTION (ML) INTRAVENOUS CONTINUOUS PRN
Status: DISCONTINUED | OUTPATIENT
Start: 2021-12-27 | End: 2021-12-27

## 2021-12-27 RX ORDER — ONDANSETRON 2 MG/ML
4 INJECTION INTRAMUSCULAR; INTRAVENOUS EVERY 30 MIN PRN
Status: DISCONTINUED | OUTPATIENT
Start: 2021-12-27 | End: 2021-12-27 | Stop reason: HOSPADM

## 2021-12-27 RX ORDER — NALOXONE HYDROCHLORIDE 1 MG/ML
0.4 INJECTION INTRAMUSCULAR; INTRAVENOUS; SUBCUTANEOUS
Status: DISCONTINUED | OUTPATIENT
Start: 2021-12-27 | End: 2021-12-29 | Stop reason: HOSPADM

## 2021-12-27 RX ORDER — IBUPROFEN 800 MG/1
800 TABLET, FILM COATED ORAL EVERY 6 HOURS
Status: DISCONTINUED | OUTPATIENT
Start: 2021-12-28 | End: 2021-12-29 | Stop reason: HOSPADM

## 2021-12-27 RX ORDER — ONDANSETRON 4 MG/1
4 TABLET, ORALLY DISINTEGRATING ORAL EVERY 30 MIN PRN
Status: DISCONTINUED | OUTPATIENT
Start: 2021-12-27 | End: 2021-12-27 | Stop reason: HOSPADM

## 2021-12-27 RX ORDER — MISOPROSTOL 200 UG/1
800 TABLET ORAL
Status: DISCONTINUED | OUTPATIENT
Start: 2021-12-27 | End: 2021-12-27 | Stop reason: HOSPADM

## 2021-12-27 RX ORDER — CEFAZOLIN SODIUM 2 G/100ML
2 INJECTION, SOLUTION INTRAVENOUS SEE ADMIN INSTRUCTIONS
Status: DISCONTINUED | OUTPATIENT
Start: 2021-12-27 | End: 2021-12-27 | Stop reason: HOSPADM

## 2021-12-27 RX ORDER — SIMETHICONE 80 MG
80 TABLET,CHEWABLE ORAL 4 TIMES DAILY PRN
Status: DISCONTINUED | OUTPATIENT
Start: 2021-12-27 | End: 2021-12-29 | Stop reason: HOSPADM

## 2021-12-27 RX ORDER — OXYTOCIN/0.9 % SODIUM CHLORIDE 30/500 ML
340 PLASTIC BAG, INJECTION (ML) INTRAVENOUS CONTINUOUS PRN
Status: DISCONTINUED | OUTPATIENT
Start: 2021-12-27 | End: 2021-12-27 | Stop reason: HOSPADM

## 2021-12-27 RX ORDER — MISOPROSTOL 200 UG/1
400 TABLET ORAL
Status: DISCONTINUED | OUTPATIENT
Start: 2021-12-27 | End: 2021-12-29 | Stop reason: HOSPADM

## 2021-12-27 RX ORDER — ONDANSETRON 2 MG/ML
4 INJECTION INTRAMUSCULAR; INTRAVENOUS EVERY 6 HOURS PRN
Status: DISCONTINUED | OUTPATIENT
Start: 2021-12-27 | End: 2021-12-29 | Stop reason: HOSPADM

## 2021-12-27 RX ORDER — MISOPROSTOL 200 UG/1
400 TABLET ORAL
Status: DISCONTINUED | OUTPATIENT
Start: 2021-12-27 | End: 2021-12-27 | Stop reason: HOSPADM

## 2021-12-27 RX ORDER — LIDOCAINE 40 MG/G
CREAM TOPICAL
Status: DISCONTINUED | OUTPATIENT
Start: 2021-12-27 | End: 2021-12-27 | Stop reason: HOSPADM

## 2021-12-27 RX ORDER — OXYTOCIN/0.9 % SODIUM CHLORIDE 30/500 ML
340 PLASTIC BAG, INJECTION (ML) INTRAVENOUS CONTINUOUS PRN
Status: DISCONTINUED | OUTPATIENT
Start: 2021-12-27 | End: 2021-12-29 | Stop reason: HOSPADM

## 2021-12-27 RX ORDER — OXYTOCIN 10 [USP'U]/ML
10 INJECTION, SOLUTION INTRAMUSCULAR; INTRAVENOUS
Status: DISCONTINUED | OUTPATIENT
Start: 2021-12-27 | End: 2021-12-27 | Stop reason: HOSPADM

## 2021-12-27 RX ORDER — LIDOCAINE HYDROCHLORIDE 10 MG/ML
INJECTION, SOLUTION EPIDURAL; INFILTRATION; INTRACAUDAL; PERINEURAL
Status: DISCONTINUED
Start: 2021-12-27 | End: 2021-12-27 | Stop reason: HOSPADM

## 2021-12-27 RX ORDER — MISOPROSTOL 200 UG/1
800 TABLET ORAL
Status: DISCONTINUED | OUTPATIENT
Start: 2021-12-27 | End: 2021-12-29 | Stop reason: HOSPADM

## 2021-12-27 RX ORDER — DIPHENHYDRAMINE HCL 25 MG
25 CAPSULE ORAL EVERY 6 HOURS PRN
Status: DISCONTINUED | OUTPATIENT
Start: 2021-12-27 | End: 2021-12-29 | Stop reason: HOSPADM

## 2021-12-27 RX ORDER — CEFAZOLIN SODIUM 2 G/100ML
2 INJECTION, SOLUTION INTRAVENOUS
Status: COMPLETED | OUTPATIENT
Start: 2021-12-27 | End: 2021-12-27

## 2021-12-27 RX ORDER — MORPHINE SULFATE 1 MG/ML
INJECTION, SOLUTION EPIDURAL; INTRATHECAL; INTRAVENOUS PRN
Status: DISCONTINUED | OUTPATIENT
Start: 2021-12-27 | End: 2021-12-27

## 2021-12-27 RX ORDER — DIPHENHYDRAMINE HYDROCHLORIDE 50 MG/ML
25 INJECTION INTRAMUSCULAR; INTRAVENOUS EVERY 6 HOURS PRN
Status: DISCONTINUED | OUTPATIENT
Start: 2021-12-27 | End: 2021-12-29 | Stop reason: HOSPADM

## 2021-12-27 RX ORDER — CARBOPROST TROMETHAMINE 250 UG/ML
250 INJECTION, SOLUTION INTRAMUSCULAR
Status: DISCONTINUED | OUTPATIENT
Start: 2021-12-27 | End: 2021-12-29 | Stop reason: HOSPADM

## 2021-12-27 RX ORDER — METHYLERGONOVINE MALEATE 0.2 MG/ML
200 INJECTION INTRAVENOUS
Status: DISCONTINUED | OUTPATIENT
Start: 2021-12-27 | End: 2021-12-29 | Stop reason: HOSPADM

## 2021-12-27 RX ORDER — TRANEXAMIC ACID 10 MG/ML
1 INJECTION, SOLUTION INTRAVENOUS EVERY 30 MIN PRN
Status: DISCONTINUED | OUTPATIENT
Start: 2021-12-27 | End: 2021-12-29 | Stop reason: HOSPADM

## 2021-12-27 RX ORDER — SODIUM CHLORIDE, SODIUM LACTATE, POTASSIUM CHLORIDE, CALCIUM CHLORIDE 600; 310; 30; 20 MG/100ML; MG/100ML; MG/100ML; MG/100ML
INJECTION, SOLUTION INTRAVENOUS CONTINUOUS PRN
Status: DISCONTINUED | OUTPATIENT
Start: 2021-12-26 | End: 2021-12-27

## 2021-12-27 RX ORDER — ONDANSETRON 4 MG/1
4 TABLET, ORALLY DISINTEGRATING ORAL EVERY 6 HOURS PRN
Status: DISCONTINUED | OUTPATIENT
Start: 2021-12-27 | End: 2021-12-29 | Stop reason: HOSPADM

## 2021-12-27 RX ORDER — OXYTOCIN 10 [USP'U]/ML
10 INJECTION, SOLUTION INTRAMUSCULAR; INTRAVENOUS
Status: DISCONTINUED | OUTPATIENT
Start: 2021-12-27 | End: 2021-12-29 | Stop reason: HOSPADM

## 2021-12-27 RX ORDER — AZITHROMYCIN 500 MG/5ML
500 INJECTION, POWDER, LYOPHILIZED, FOR SOLUTION INTRAVENOUS
Status: COMPLETED | OUTPATIENT
Start: 2021-12-27 | End: 2021-12-27

## 2021-12-27 RX ORDER — FENTANYL CITRATE-0.9 % NACL/PF 10 MCG/ML
PLASTIC BAG, INJECTION (ML) INTRAVENOUS CONTINUOUS PRN
Status: DISCONTINUED | OUTPATIENT
Start: 2021-12-27 | End: 2021-12-27

## 2021-12-27 RX ORDER — AMOXICILLIN 250 MG
1 CAPSULE ORAL 2 TIMES DAILY
Status: DISCONTINUED | OUTPATIENT
Start: 2021-12-27 | End: 2021-12-29 | Stop reason: HOSPADM

## 2021-12-27 RX ORDER — FENTANYL CITRATE 50 UG/ML
INJECTION, SOLUTION INTRAMUSCULAR; INTRAVENOUS PRN
Status: DISCONTINUED | OUTPATIENT
Start: 2021-12-27 | End: 2021-12-27

## 2021-12-27 RX ORDER — ACETAMINOPHEN 325 MG/1
975 TABLET ORAL EVERY 6 HOURS
Status: DISCONTINUED | OUTPATIENT
Start: 2021-12-27 | End: 2021-12-29 | Stop reason: HOSPADM

## 2021-12-27 RX ORDER — BISACODYL 10 MG
10 SUPPOSITORY, RECTAL RECTAL DAILY PRN
Status: DISCONTINUED | OUTPATIENT
Start: 2021-12-29 | End: 2021-12-29 | Stop reason: HOSPADM

## 2021-12-27 RX ORDER — OXYTOCIN 10 [USP'U]/ML
INJECTION, SOLUTION INTRAMUSCULAR; INTRAVENOUS
Status: DISCONTINUED
Start: 2021-12-27 | End: 2021-12-27 | Stop reason: HOSPADM

## 2021-12-27 RX ORDER — AMOXICILLIN 250 MG
2 CAPSULE ORAL 2 TIMES DAILY
Status: DISCONTINUED | OUTPATIENT
Start: 2021-12-27 | End: 2021-12-29 | Stop reason: HOSPADM

## 2021-12-27 RX ORDER — METOCLOPRAMIDE HYDROCHLORIDE 5 MG/ML
10 INJECTION INTRAMUSCULAR; INTRAVENOUS EVERY 6 HOURS PRN
Status: DISCONTINUED | OUTPATIENT
Start: 2021-12-27 | End: 2021-12-29 | Stop reason: HOSPADM

## 2021-12-27 RX ORDER — KETOROLAC TROMETHAMINE 30 MG/ML
30 INJECTION, SOLUTION INTRAMUSCULAR; INTRAVENOUS EVERY 6 HOURS
Status: COMPLETED | OUTPATIENT
Start: 2021-12-27 | End: 2021-12-28

## 2021-12-27 RX ORDER — OXYTOCIN 10 [USP'U]/ML
10 INJECTION, SOLUTION INTRAMUSCULAR; INTRAVENOUS
Status: DISCONTINUED | OUTPATIENT
Start: 2021-12-27 | End: 2021-12-27

## 2021-12-27 RX ORDER — DEXTROSE, SODIUM CHLORIDE, SODIUM LACTATE, POTASSIUM CHLORIDE, AND CALCIUM CHLORIDE 5; .6; .31; .03; .02 G/100ML; G/100ML; G/100ML; G/100ML; G/100ML
INJECTION, SOLUTION INTRAVENOUS CONTINUOUS
Status: DISCONTINUED | OUTPATIENT
Start: 2021-12-27 | End: 2021-12-29 | Stop reason: HOSPADM

## 2021-12-27 RX ORDER — OXYCODONE HYDROCHLORIDE 5 MG/1
5 TABLET ORAL EVERY 4 HOURS PRN
Status: DISCONTINUED | OUTPATIENT
Start: 2021-12-27 | End: 2021-12-29 | Stop reason: HOSPADM

## 2021-12-27 RX ORDER — MODIFIED LANOLIN
OINTMENT (GRAM) TOPICAL
Status: DISCONTINUED | OUTPATIENT
Start: 2021-12-27 | End: 2021-12-29 | Stop reason: HOSPADM

## 2021-12-27 RX ORDER — LIDOCAINE 40 MG/G
CREAM TOPICAL
Status: DISCONTINUED | OUTPATIENT
Start: 2021-12-27 | End: 2021-12-29 | Stop reason: HOSPADM

## 2021-12-27 RX ORDER — KETOROLAC TROMETHAMINE 30 MG/ML
INJECTION, SOLUTION INTRAMUSCULAR; INTRAVENOUS PRN
Status: DISCONTINUED | OUTPATIENT
Start: 2021-12-27 | End: 2021-12-27

## 2021-12-27 RX ORDER — METOCLOPRAMIDE 10 MG/1
10 TABLET ORAL EVERY 6 HOURS PRN
Status: DISCONTINUED | OUTPATIENT
Start: 2021-12-27 | End: 2021-12-29 | Stop reason: HOSPADM

## 2021-12-27 RX ORDER — ACETAMINOPHEN 325 MG/1
975 TABLET ORAL ONCE
Status: DISCONTINUED | OUTPATIENT
Start: 2021-12-27 | End: 2021-12-27 | Stop reason: HOSPADM

## 2021-12-27 RX ORDER — PROCHLORPERAZINE MALEATE 10 MG
10 TABLET ORAL EVERY 6 HOURS PRN
Status: DISCONTINUED | OUTPATIENT
Start: 2021-12-27 | End: 2021-12-29 | Stop reason: HOSPADM

## 2021-12-27 RX ORDER — HYDROCORTISONE 2.5 %
CREAM (GRAM) TOPICAL 3 TIMES DAILY PRN
Status: DISCONTINUED | OUTPATIENT
Start: 2021-12-27 | End: 2021-12-29 | Stop reason: HOSPADM

## 2021-12-27 RX ORDER — PROCHLORPERAZINE 25 MG
25 SUPPOSITORY, RECTAL RECTAL EVERY 12 HOURS PRN
Status: DISCONTINUED | OUTPATIENT
Start: 2021-12-27 | End: 2021-12-29 | Stop reason: HOSPADM

## 2021-12-27 RX ADMIN — BUPIVACAINE 20 ML: 13.3 INJECTION, SUSPENSION, LIPOSOMAL INFILTRATION at 07:39

## 2021-12-27 RX ADMIN — PHENYLEPHRINE HYDROCHLORIDE 100 MCG: 10 INJECTION INTRAVENOUS at 06:14

## 2021-12-27 RX ADMIN — KETOROLAC TROMETHAMINE 30 MG: 30 INJECTION, SOLUTION INTRAMUSCULAR at 14:14

## 2021-12-27 RX ADMIN — SODIUM CHLORIDE, POTASSIUM CHLORIDE, SODIUM LACTATE AND CALCIUM CHLORIDE: 600; 310; 30; 20 INJECTION, SOLUTION INTRAVENOUS at 01:53

## 2021-12-27 RX ADMIN — Medication 300 ML/HR: at 06:46

## 2021-12-27 RX ADMIN — TRANEXAMIC ACID 1 G: 1 INJECTION, SOLUTION INTRAVENOUS at 06:53

## 2021-12-27 RX ADMIN — PHENYLEPHRINE HYDROCHLORIDE 100 MCG: 10 INJECTION INTRAVENOUS at 07:01

## 2021-12-27 RX ADMIN — Medication 100 ML/HR: at 08:40

## 2021-12-27 RX ADMIN — SODIUM CITRATE AND CITRIC ACID MONOHYDRATE 30 ML: 500; 334 SOLUTION ORAL at 05:50

## 2021-12-27 RX ADMIN — LIDOCAINE HYDROCHLORIDE,EPINEPHRINE BITARTRATE 5 ML: 20; .005 INJECTION, SOLUTION EPIDURAL; INFILTRATION; INTRACAUDAL; PERINEURAL at 06:24

## 2021-12-27 RX ADMIN — KETOROLAC TROMETHAMINE 30 MG: 30 INJECTION, SOLUTION INTRAMUSCULAR at 21:38

## 2021-12-27 RX ADMIN — DOCUSATE SODIUM AND SENNOSIDES 2 TABLET: 8.6; 5 TABLET ORAL at 21:38

## 2021-12-27 RX ADMIN — SIMETHICONE CHEW TAB 80 MG 80 MG: 80 TABLET ORAL at 18:32

## 2021-12-27 RX ADMIN — BUPIVACAINE HYDROCHLORIDE 20 ML: 2.5 INJECTION, SOLUTION EPIDURAL; INFILTRATION; INTRACAUDAL at 07:39

## 2021-12-27 RX ADMIN — MORPHINE SULFATE 3 MG: 1 INJECTION EPIDURAL; INTRATHECAL; INTRAVENOUS at 06:51

## 2021-12-27 RX ADMIN — LIDOCAINE HYDROCHLORIDE,EPINEPHRINE BITARTRATE 5 ML: 20; .005 INJECTION, SOLUTION EPIDURAL; INFILTRATION; INTRACAUDAL; PERINEURAL at 06:21

## 2021-12-27 RX ADMIN — Medication 2 G: at 06:21

## 2021-12-27 RX ADMIN — FENTANYL CITRATE 50 MCG: 50 INJECTION, SOLUTION INTRAMUSCULAR; INTRAVENOUS at 06:21

## 2021-12-27 RX ADMIN — PHENYLEPHRINE HYDROCHLORIDE 100 MCG: 10 INJECTION INTRAVENOUS at 07:17

## 2021-12-27 RX ADMIN — ONDANSETRON 4 MG: 2 INJECTION INTRAMUSCULAR; INTRAVENOUS at 06:24

## 2021-12-27 RX ADMIN — SODIUM CHLORIDE, POTASSIUM CHLORIDE, SODIUM LACTATE AND CALCIUM CHLORIDE: 600; 310; 30; 20 INJECTION, SOLUTION INTRAVENOUS at 06:16

## 2021-12-27 RX ADMIN — HUMAN RHO(D) IMMUNE GLOBULIN 300 MCG: 1500 SOLUTION INTRAMUSCULAR; INTRAVENOUS at 18:59

## 2021-12-27 RX ADMIN — Medication 500 MG: at 06:24

## 2021-12-27 RX ADMIN — OXYCODONE HYDROCHLORIDE 5 MG: 5 TABLET ORAL at 22:28

## 2021-12-27 RX ADMIN — PHENYLEPHRINE HYDROCHLORIDE 100 MCG: 10 INJECTION INTRAVENOUS at 07:11

## 2021-12-27 RX ADMIN — CALCIUM CARBONATE (ANTACID) CHEW TAB 500 MG 500 MG: 500 CHEW TAB at 01:00

## 2021-12-27 RX ADMIN — LIDOCAINE HYDROCHLORIDE,EPINEPHRINE BITARTRATE 5 ML: 20; .005 INJECTION, SOLUTION EPIDURAL; INFILTRATION; INTRACAUDAL; PERINEURAL at 07:02

## 2021-12-27 RX ADMIN — KETOROLAC TROMETHAMINE 30 MG: 30 INJECTION, SOLUTION INTRAMUSCULAR at 07:16

## 2021-12-27 RX ADMIN — OXYCODONE HYDROCHLORIDE 5 MG: 5 TABLET ORAL at 18:24

## 2021-12-27 RX ADMIN — PHENYLEPHRINE HYDROCHLORIDE 100 MCG: 10 INJECTION INTRAVENOUS at 06:29

## 2021-12-27 RX ADMIN — FENTANYL CITRATE 50 MCG: 50 INJECTION, SOLUTION INTRAMUSCULAR; INTRAVENOUS at 06:36

## 2021-12-27 RX ADMIN — PHENYLEPHRINE HYDROCHLORIDE 100 MCG: 10 INJECTION INTRAVENOUS at 06:54

## 2021-12-27 RX ADMIN — TRANEXAMIC ACID 1 G: 1 INJECTION, SOLUTION INTRAVENOUS at 06:56

## 2021-12-27 RX ADMIN — ACETAMINOPHEN 975 MG: 325 TABLET, FILM COATED ORAL at 11:28

## 2021-12-27 RX ADMIN — LIDOCAINE HYDROCHLORIDE,EPINEPHRINE BITARTRATE 5 ML: 20; .005 INJECTION, SOLUTION EPIDURAL; INFILTRATION; INTRACAUDAL; PERINEURAL at 06:35

## 2021-12-27 RX ADMIN — Medication 50 MCG/MIN: at 06:32

## 2021-12-27 RX ADMIN — PHENYLEPHRINE HYDROCHLORIDE 100 MCG: 10 INJECTION INTRAVENOUS at 06:43

## 2021-12-27 RX ADMIN — LIDOCAINE HYDROCHLORIDE,EPINEPHRINE BITARTRATE 5 ML: 20; .005 INJECTION, SOLUTION EPIDURAL; INFILTRATION; INTRACAUDAL; PERINEURAL at 06:04

## 2021-12-27 RX ADMIN — ACETAMINOPHEN 975 MG: 325 TABLET, FILM COATED ORAL at 18:23

## 2021-12-27 ASSESSMENT — MIFFLIN-ST. JEOR: SCORE: 1623.58

## 2021-12-27 NOTE — PLAN OF CARE
Pt requesting epidural at 2040. Bolus started and patient positioned.    Comfortable following procedure

## 2021-12-27 NOTE — ANESTHESIA POSTPROCEDURE EVALUATION
"Patient: Nilsa Goldman    Procedure: Procedure(s):   SECTION       Diagnosis: delivery w/o mention of indication, wesly baxter [O82]  Diagnosis Additional Information: No value filed.    Anesthesia Type:  Epidural    Note:  Disposition: Inpatient   Postop Pain Control: Uneventful            Sign Out: Well controlled pain   PONV: No   Neuro/Psych: Uneventful            Sign Out: Acceptable/Baseline neuro status   Airway/Respiratory: Uneventful            Sign Out: Acceptable/Baseline resp. status   CV/Hemodynamics: Uneventful            Sign Out: Acceptable CV status   Other NRE: NONE   DID A NON-ROUTINE EVENT OCCUR? No           Last vitals:  Vitals Value Taken Time   /68 21 0900   Temp 37.2  C (98.9  F) 21 0845   Pulse 104 21 0900   Resp 20 21 09   SpO2 98 % 21     Patient Vitals for the past 24 hrs:   BP Temp Temp src Pulse Resp SpO2 Height Weight   21 1510 113/70 -- -- 100 -- 98 % -- --   21 1400 97/51 -- -- 95 -- 98 % -- --   21 1254 114/58 -- -- 97 -- 97 % -- --   21 1200 116/61 -- -- 105 -- 97 % -- --   21 1100 113/59 -- -- 101 -- 97 % -- --   21 1000 108/54 -- -- -- -- 97 % -- --   21 0930 107/64 37.1  C (98.8  F) Oral 101 -- 96 % 1.727 m (5' 8\") 83 kg (183 lb)   21 0928 114/66 -- -- -- -- -- -- --   21 0900 108/68 -- -- 104 20 98 % -- --   21 0845 108/70 37.2  C (98.9  F) Oral (!) 121 18 99 % -- --   21 0830 107/61 -- -- 113 20 98 % -- --   21 0815 117/53 -- -- 114 20 99 % -- --   21 0800 105/65 -- -- (!) 121 20 99 % -- --   21 0745 -- -- -- -- 20 99 % -- --   21 0730 108/56 -- -- 72 -- 98 % -- --   21 0559 -- 37.1  C (98.7  F) Oral -- 16 -- -- --   21 0501 128/58 36.7  C (98.1  F) Oral -- 16 -- -- --   21 0350 122/70 36.9  C (98.4  F) Oral -- 16 -- -- --   21 0243 120/77 36.8  C (98.3  F) Oral -- 16 -- -- --   21 " 0135 112/75 36.9  C (98.4  F) Oral 85 16 -- -- --   12/27/21 0130 -- -- -- -- -- 99 % -- --   12/27/21 0017 -- 36.6  C (97.9  F) Oral -- 16 -- -- --   12/26/21 2352 104/58 -- -- -- -- 96 % -- --   12/26/21 2323 92/53 -- -- -- -- -- -- --   12/26/21 2253 92/54 -- -- -- -- -- -- --   12/26/21 2222 96/51 -- -- -- 16 97 % -- --   12/26/21 2200 -- -- -- -- -- 97 % -- --   12/26/21 2150 109/61 -- -- -- 16 -- -- --   12/26/21 2147 -- -- -- -- -- 97 % -- --   12/26/21 2145 100/60 -- -- -- 16 -- -- --   12/26/21 2138 109/64 -- -- -- 16 -- -- --   12/26/21 2134 110/63 -- -- -- 16 -- -- --   12/26/21 2130 -- -- -- -- -- 97 % -- --   12/26/21 2126 94/55 -- -- -- 16 -- -- --   12/26/21 2124 96/55 -- -- -- 18 -- -- --   12/26/21 2122 99/54 -- -- -- 18 98 % -- --   12/26/21 2120 101/52 -- -- -- 18 -- -- --   12/26/21 2119 101/52 -- -- -- 18 96 % -- --   12/26/21 2118 97/54 -- -- -- 18 -- -- --   12/26/21 2116 98/56 -- -- -- 18 -- -- --   12/26/21 2114 97/55 36.8  C (98.2  F) Oral -- 18 -- -- --   12/26/21 2112 95/51 -- -- -- 20 98 % -- --   12/26/21 2110 101/58 -- -- -- 20 -- -- --   12/26/21 2108 109/58 -- -- -- 20 -- -- --   12/26/21 1930 105/64 36.7  C (98.1  F) Oral -- 20 -- -- --   12/26/21 1730 121/77 36.6  C (97.8  F) Oral -- 18 -- -- --   12/26/21 1623 -- 36.6  C (97.9  F) Oral -- -- -- -- --       Electronically Signed By: Yu Moran MD  December 27, 2021  3:31 PM

## 2021-12-27 NOTE — ANESTHESIA CARE TRANSFER NOTE
Patient: Nilsa Goldman    Procedure: Procedure(s):   SECTION       Diagnosis:  delivery w/o mention of indication, wesly baxter [O82]  Diagnosis Additional Information: No value filed.    Anesthesia Type:   Epidural     Note:    Oropharynx: spontaneously breathing  Level of Consciousness: awake  Oxygen Supplementation: room air    Independent Airway: airway patency satisfactory and stable  Dentition: dentition unchanged  Vital Signs Stable: post-procedure vital signs reviewed and stable  Report to RN Given: handoff report given  Patient transferred to: PACU    Handoff Report: Identifed the Patient, Identified the Reponsible Provider, Reviewed the pertinent medical history, Discussed the surgical course, Reviewed Intra-OP anesthesia mangement and issues during anesthesia, Set expectations for post-procedure period and Allowed opportunity for questions and acknowledgement of understanding      Vitals:  Vitals Value Taken Time   /68 21 0900   Temp 37.2  C (98.9  F) 21 0845   Pulse 104 21 0900   Resp 20 21 0900   SpO2 98 % 21 0900       Electronically Signed By: Jose L Abrams MD  2021  4:21 PM

## 2021-12-27 NOTE — PLAN OF CARE
Transfer to OR & C/S Delivery Note  Patient to OR at 0615 via bed.   Delivered viable Female via primary  section by Dr. Dorsey.  Brought to mother after bundling for bonding.

## 2021-12-27 NOTE — ANESTHESIA CARE TRANSFER NOTE
Patient: Nilsa Goldman    Procedure: Procedure(s):   SECTION       Diagnosis:  delivery w/o mention of indication, wesly baxter [O82]  Diagnosis Additional Information: No value filed.    Anesthesia Type:   Epidural     Note:    Oropharynx: oropharynx clear of all foreign objects  Level of Consciousness: drowsy and awake  Oxygen Supplementation: room air    Independent Airway: airway patency satisfactory and stable  Dentition: dentition unchanged  Vital Signs Stable: post-procedure vital signs reviewed and stable  Report to RN Given: handoff report given  Patient transferred to: PACU    Handoff Report: Identifed the Patient, Identified the Reponsible Provider, Reviewed the pertinent medical history, Discussed the surgical course, Reviewed Intra-OP anesthesia mangement and issues during anesthesia, Set expectations for post-procedure period and Allowed opportunity for questions and acknowledgement of understanding      Vitals:  Vitals Value Taken Time   /65 21 0800   Temp     Pulse 111 21 0809   Resp     SpO2     Vitals shown include unvalidated device data.    Electronically Signed By: Yu Moran MD  2021  8:10 AM

## 2021-12-27 NOTE — PROGRESS NOTES
Labor progress note    S:  Comfortable with epidural. Agreeable to cervical exam.     O:  Blood pressure 104/58, temperature 97.9  F (36.6  C), temperature source Oral, resp. rate 16, last menstrual period 03/08/2021, SpO2 96 %.  General appearance: comfortable.  Contractions: Every 2-3 minutes. 40-70 seconds duration.  Palpate: strong.  FHT: Baseline 130 with moderate variability. Accelerations present. indeterminate decelerations due to incomplete tracing. Switching from [place] monitor to EFM.   ROM: light meconium fluid . Membranes have been ruptured for 50 hours.  Pelvic exam: 4/ 80%/ Mid/ soft/ -2  Braun Score: 8  -------------------------------  Pitocin- 6 mu/min.,  Antibiotics- none    A:  IUP @ 42w0d augmentation for prolonged PROM   Fetal Heart rate tracing Category one during contiguous tracing.  GBS- negative  Patient Active Problem List   Diagnosis     Labor and delivery, indication for care     Pregnancy with prenatal care elsewhere         P:  comfort measures prn   Pain medication comfortable with epidural  Encourage position changes/side lying release to promote fetal rotation due to suspected OP.   Labor augmentation continues with Pitocin    LORE Horan CNM    0200 Update:  Patient still comfortable with epidural. Resting. Changing positions to facilitate fetal descent. Straight cath 1000mL per RN. Pitocin remains 6mu. 0112 possible deceleration though tracing incomplete. Moderate variability with periods of minimal. No accelerations. Overall category I.    LORE Horan CNM

## 2021-12-27 NOTE — OP NOTE
[facility]  Full Operative Progress Note     Name: Nilsa Goldman  MRN: [id number]  : 1995  Date of Surgery: 2021    Pre-op Diagnosis:     at 42w0d  Prolonged rupture of membranes  Category 2 remote from delivery    Post-op Diagnosis:    Same, now   Liveborn female infant       Procedure:  Primary low-transverse  section with double layer uterine closure via Pfannenstiel skin incision      Surgeon:  Michelle Dorsey MD    Assistants:  Lina Vincent MD, PGY-2    Brittany Simpson MD      Anesthesia: Spinal, TAP block    QBL:  580 ml    Urine Output:  500 mL clear urine at the end of the case    Fluids:  1600 mL crystalloid    Medications: Ancef 2g, Azithromycin 500mg, 30U Pitocin    Drains: Guadarrama Catheter       Specimens:  Cord blood    Complications: None apparent    Indications:   Nilsa Goldman is a 26 year old year old  at 42w0d who initially presented for augmentation after PROM for 39 hours without labor. She was augmented with pitocin and progressed to 5-6cm dilation. At this point she developed a category 2 tracing with minimal variability for > 2 hours without improvement despite multiple attempts at intrauterine resuscitation. Patient was 56 hours s/p ROM at this time. At this time recommendation was made to proceed with  delivery. The risks, benefits, and alternatives of  section were discussed with the patient, and she agreed to proceed.     Findings:   - A single vigorous, liveborn female weighing 3920g with apgars of 8 and 9.  - Normal appearing uterus, fallopian tubes, ovaries.    - No nuchal cord. Meconium stained amniotic fluid.   - No intraabdominal or recto-fascial adhesions.    Procedure Details:   The patient was brought to the OR, where adequate epidural anesthesia was administered.  She was placed in the dorsal supine position with a slight leftward tilt. She was prepped and draped in the usual sterile  fashion. A surgical time out was performed. A pfannenstiel skin incision was made with the scalpel, and carried down to the underlying fascia with sharp and blunt dissection. The fascia was incised in the midline, and the incision was extended laterally bluntly in the Armando-Sharif fashion. The rectus muscles were  in the midline, and the peritoneum was entered bluntly, and the opening was extended with digital pressure. The bladder blade was placed. A transverse hysterotomy was made with the scalpel in the lower uterine segment, and the incision was extended with digital pressure. The infant was noted to be in the OP position, and was delivered atraumatically. The shoulders delivered easily.  No nuchal cord was noted. The cord was doubly clamped and cut, and the infant was handed off to the awaiting NICU staff. A segment of cord was cut and discarded. The placenta was delivered with gentle traction on the umbilical cord and uterine massage. The uterus was exteriorized and cleared of all clots and debris. Uterine tone was noted to be moderate with 30 units of pitocin given through the running IV and uterine massage. 1g TXA was given. The hysterotomy was closed with a running locked suture of 0 Vicryl.  The hysterotomy was then imbricated using an 0 Monocryl suture. The hysterotomy was noted to be hemostatic. The posterior cul-de-sac was cleared of all clots and debris. The uterus was returned to the abdomen. The pericolic gutters were cleared of all clots and debris. The hysterotomy was reexamined and noted to be hemostatic. The fascia and rectus muscles were examined and areas of oozing were controlled with electrocautery. The fascia was closed with a running 0 Vicryl suture. The subcutaneous tissue was irrigated and areas of oozing were controlled with electrocautery. 3 interrupted horizontal mattress sutures were placed in the subdermal layer to re-approximate the skin. The skin was closed with 4-0 Monocryl  and covered with a sterile dressing.    All sponge, needle, and instrument counts were correct. The patient tolerated the procedure well, and was transferred to recovery in stable condition. Dr. Dorsey present and scrubbed until fascial closure was complete, Dr. Simpson was then present and scrubbed for the remaining portion of the procedure.     Lina Vincent MD  Obstetrics & Gynecology, PGY-2  12/27/2021 8:08 AM      I was present and scrubbed for entire procedure.   Michelle Dorsey MD

## 2021-12-27 NOTE — ANESTHESIA PROCEDURE NOTES
TAP Procedure Note    Pre-Procedure   Staff -        Anesthesiologist:  Yu Moran MD       Resident/Fellow: Jose L Abrams MD       Performed By: anesthesiologist and resident       Location: OR       Pre-Anesthestic Checklist: patient identified, IV checked, site marked, risks and benefits discussed, informed consent, monitors and equipment checked, pre-op evaluation, at physician/surgeon's request and post-op pain management  Timeout:       Correct Patient: Yes        Correct Procedure: Yes        Correct Site: Yes        Correct Position: Yes        Correct Laterality: Yes        Site Marked: Yes  Procedure Documentation  Procedure: TAP       Laterality: bilateral       Patient Position: supine       Skin prep: Chloraprep       Needle Type: short bevel       Needle Gauge: 21.        Needle Length (millimeters): 110         Catheter threaded easily.         Ultrasound guided       1. Ultrasound was used to identify targeted nerve, plexus, vascular marker, or fascial plane and place a needle adjacent to it in real-time.       2. Ultrasound was used to visualize the spread of anesthetic in close proximity to the above referenced structure.    Assessment/Narrative         The placement was negative for: blood aspirated, painful injection and site bleeding       Paresthesias: No.      Bolus given via needle. No blood aspirated via catheter.        Secured via.        Insertion/Infusion Method: Single Shot       Complications: none    Medication(s) Administered   Bupivacaine 0.25% PF (Infiltration), 20 mL  Bupivacaine liposome (Exparel) 1.3% LA inj susp (Infiltration), 20 mL  Medication Administration Time: 12/27/2021 7:39 AM

## 2021-12-27 NOTE — PLAN OF CARE
Data: Nilsa Goldman transferred to 7121 via cart at 0915. Baby transferred via parent's arms.  Action: Receiving unit notified of transfer: Yes. Patient and family notified of room change. Report given to Tanya RODRIGUEZ at 0930. Belongings sent to receiving unit. Accompanied by Registered Nurse. Oriented patient to surroundings. Call light within reach. ID bands double-checked with receiving RN.  Response: Patient tolerated transfer and is stable.

## 2021-12-27 NOTE — PROGRESS NOTES
Labor progress note    S:  Comfortable with epidural. Coping well with support from partner and .     O:  Blood pressure 96/51, temperature 98.2  F (36.8  C), temperature source Oral, resp. rate 16, last menstrual period 03/08/2021, SpO2 97 %.  General appearance: comfortable.  Contractions: Every 1.5-3 minutes. 60-70 seconds duration.    FHT: Baseline 140 with moderate variability. Acceleration absent. Intermittent deceleration x1 @ 2201 with spontaneous resolution.   ROM: light meconium fluid . Membranes have been ruptured for 49 hours.  Pelvic exam: deferred  -------------------------------  Pitocin- 6 mu/min.,  Antibiotics- none    A:  IUP @ 41w6d PROM, prolonged   Fetal Heart rate tracing Category one overall.   GBS- negative  Patient Active Problem List   Diagnosis     Labor and delivery, indication for care     Pregnancy with prenatal care elsewhere         P:  comfort measures prn   Pain medication- comfortable with epidural  Encouraged position changes in bed with peanut ball.   Encouraged rest.  Labor augmentation continues with Pitocin    LORE Horan CNM

## 2021-12-27 NOTE — PROGRESS NOTES
Labor progress note    S:  Reports feeling exhausted from laboring x2 days. Open to epidural.     O:  Blood pressure 105/64, temperature 98.1  F (36.7  C), temperature source Oral, resp. rate 20, last menstrual period 03/08/2021.  General appearance: uncomfortable with contractions.  Contractions: Every 2 minutes. 60 seconds duration.  Palpate: strong.  FHT: Baseline 140 with moderate variability. Accelerations NOT present. x1 incompletely traced deceleration at 2026. Resolved with position change.  ROM: light meconium fluid . Membranes have been ruptured for 47 hours.  Pelvic exam: deferred    Pitocin- 4 mu/min.,  Antibiotics- none    A:  IUP @ 41w6d active labor  Fetal Heart rate tracing Category one overall. 1 broken deceleration on [place] monitor.   GBS- negative  Patient Active Problem List   Diagnosis     Labor and delivery, indication for care     Pregnancy with prenatal care elsewhere         P:  Pain medication - planning epidural. Fluid bolus started. Verbal report given to anesthesia for epidural placement.   Labor augmentation with Pitocin--turned down to 4mu    LORE Horan CNM

## 2021-12-27 NOTE — PLAN OF CARE
Patient arrived to [facility] unit via zoom cart at 0915,with belongings, accompanied by spouse/ significant other, with infant in arms. Received report from MICHAEL Lu and checked bands. Unit and room orientation started. Call light given; no concerns present at this time. Continue with plan of care.

## 2021-12-27 NOTE — PROVIDER NOTIFICATION
12/27/21 0302   Provider Notification   Provider Name/Title Anna CNYAMILKA   Method of Notification Electronic Page   Request Evaluate - Remote   Notification Reason Status Update   informed CNM that strip has been minimal despite bolus and decrease in pitocin. Will reposition again and straight cath

## 2021-12-27 NOTE — PROGRESS NOTES
Labor progress note    S:  Comfortable with epidural. Agreeable to cervical exam.    O:  Blood pressure 122/70, pulse 85, temperature 98.4  F (36.9  C), temperature source Oral, resp. rate 16, last menstrual period 03/08/2021, SpO2 99 %.  General appearance: comfortable.  Contractions: Every 2-3.5 minutes. 40-60 seconds duration.    FHT: Baseline 150 with minimal variability. No accelerations present. no decelerations present.  ROM: light meconium fluid . Membranes have been ruptured for 54 hours.  Pelvic exam: 5.5/ 80%/ Mid/ soft/ -2  -------------------------------  Pitocin- 1 mu/min.,  Antibiotics- none    A:  IUP @ 42w0d prolonged PROM,    Fetal Heart rate tracing Category two, minimal variability, no decelerations  GBS- negative  Patient Active Problem List   Diagnosis     Labor and delivery, indication for care     Pregnancy with prenatal care elsewhere         P:  Labor augmentation continues with Pitocin  Repositioned to hands and knees.   250cc fluid bolus to start now  Discussed tracing with G2 resident who reviewed with Dr. Dorsey. Watch closely over next 20-30 minutes.   LORE Horan CNM

## 2021-12-27 NOTE — BRIEF OP NOTE
Brief Operative Note   Name: Nilsa Goldman  MRN: [id number]  : 1995  Date of Surgery: 2021    Pre-operative Diagnosis:    at 42w0d  Prolonged rupture of membranes  Rh negative, Claire positive  Failed GCT, no GTT  Category 2 remote from delivery  Post-operative Diagnosis:   Same, now   Procedure(s): Primary low transverse  section with double layer uterine closure via Pfannenstiel skin incision    Surgeon:  Michelle Dorsey MD   Assistants:  Lina Vincent MD, PGY-2  Brittany Simpson MD    Anesthesia: Epidural anesthesia, TAP block  QBL: 580 mL   Urine Output: 500 mL clear urine   Fluids: 1600 mL crystalloid  Medications: Ancef 2g, Azithromycin 500mg, Pitocin 30U  Drains: Guadarrama    Specimens: Cord blood  Complications: None apparent.  Findings:  - A single vigorous, liveborn female weighing 3920g with apgars of 8 and 9.  - Normal appearing uterus, fallopian tubes, ovaries.    - No nuchal cord. Meconium stained amniotic fluid.   - No intraabdominal or recto-fascial adhesions.    Lina Vincent MD  Obstetrics & Gynecology, PGY-2  2021 5:49 AM

## 2021-12-27 NOTE — DISCHARGE SUMMARY
[facility] Discharge Summary    Nilsa Goldman MRN# [id number]   Age: 26 year old YOB: 1995     Date of Admission:  2021  Date of Discharge:    Admitting Physician:  LORE Velasquez CNYAMILKA  Discharge Physician:  Michelle Dorsey MD             Admission Diagnoses:    at 41w6d  PROM, 39 hours  Rh negative  Elevated GCT, no GTT  Due for COVID vaccine booster          Discharge Diagnosis:     Same, now , delivered   Category 2 remote from delivery  Planning for COVID vaccine booster in postpartum period          Procedures:     Procedure(s): - Primary low transverse  section with double layer closure via Pfannenstiel skin incision  - Epidural anesthesia  - TAP block                Medications Prior to Admission:     Medications Prior to Admission   Medication Sig Dispense Refill Last Dose     pramoxine 1 % RE foam Place 1 g rectally every 4 hours as needed (rectal irritation and after bowel movements) 60 g 0 Unknown at Unknown time     Prenatal Vit-Fe Fumarate-FA (PRENATAL MULTIVITAMIN W/IRON) 27-0.8 MG tablet Take 1 tablet by mouth daily   2021 at Unknown time             Discharge Medications:        Review of your medicines      START taking      Dose / Directions   acetaminophen 325 MG tablet  Commonly known as: TYLENOL  Used for: S/P  section      Dose: 650 mg  Take 2 tablets (650 mg) by mouth every 6 hours as needed for mild pain Start after Delivery.  Quantity: 100 tablet  Refills: 0     ibuprofen 600 MG tablet  Commonly known as: ADVIL/MOTRIN  Used for: S/P  section      Dose: 600 mg  Take 1 tablet (600 mg) by mouth every 6 hours as needed for moderate pain Start after delivery  Quantity: 60 tablet  Refills: 0     norethindrone 0.35 MG tablet  Commonly known as: MICRONOR  Used for: S/P  section      Dose: 0.35 mg  Take 1 tablet (0.35 mg) by mouth daily  Quantity: 90 tablet  Refills: 3     senna-docusate 8.6-50 MG  tablet  Commonly known as: SENOKOT-S/PERICOLACE  Used for: S/P  section      Dose: 1 tablet  Take 1 tablet by mouth daily Start after delivery.  Quantity: 100 tablet  Refills: 0        CONTINUE these medicines which have NOT CHANGED      Dose / Directions   pramoxine 1 % foam  Commonly known as: PROCTOFOAM      Dose: 1 applicator  Place 1 g rectally every 4 hours as needed (rectal irritation and after bowel movements)  Quantity: 60 g  Refills: 0     prenatal multivitamin w/iron 27-0.8 MG tablet      Dose: 1 tablet  Take 1 tablet by mouth daily  Refills: 0           Where to get your medicines      These medications were sent to [facility] [place], [place] - [address]  [address] [address]    Phone: [phone]     acetaminophen 325 MG tablet    ibuprofen 600 MG tablet    norethindrone 0.35 MG tablet    senna-docusate 8.6-50 MG tablet               Consultations:   Anesthesia  Obstetrics          Brief Admission History:   Ms. Nilsa Goldman is a 26 year old now  who initially presented at 42w0d for augmentation after PROM for 39 hours without labor. She was augmented with pitocin and progressed to 5-6cm dilation. At this point she developed a category 2 tracing with minimal variability for > 2 hours without improvement despite multiple attempts at intrauterine resuscitation. Patient was 56 hours s/p ROM at this time. At this time recommendation was made to proceed with  delivery. Risks and benefits were discussed and patient agreed to proceed.       Intraoperative course   The procedure was uncomplicated.   mL.  See operative report for details.     - A single vigorous, liveborn female weighing 3920g with apgars of 8 and 9.  - Normal appearing uterus, fallopian tubes, ovaries.    - No nuchal cord. Meconium stained amniotic fluid.   - No intraabdominal or recto-fascial adhesions.       Postpartum Course   The patient's hospital course  was unremarkable.  She recovered as anticipated and experienced no post-operative complications. On discharge, her pain was well controlled. Vaginal bleeding is similar to light menstrual flow.  Voiding without difficulty.  Ambulating well and tolerating a normal diet.  No fever or significant wound drainage. Infant is stable She was discharged on post-partum day #2.    FBG on POD#1 was 72.  Patient was due for the the COVID vaccine booster during this stay. As she had cold-like symptoms after her first and second dose of this vaccine, she elected to get this after recovery from surgery in the postpartum period. COVID precautions discussed.     She desired POPs for contraception, which were prescribed at discharge.    Post-partum hemoglobin:   Hemoglobin   Date Value Ref Range Status   12/28/2021 10.3 (L) 11.7 - 15.7 g/dL Final   02/19/2020 13.5 11.7 - 15.7 g/dL Final             Discharge Instructions and Follow-Up:     Discharge diet: Regular   Discharge activity: No lifting greater than 20 lbs, pushing, pulling, or other strenuous activity for 6 weeks. Pelvic rest for 6 weeks including no sexual intercourse, tampons, or douching. No driving until you can slam on the brakes without pain or while on narcotic pain medications.    Discharge follow-up: Follow up with primary OB for routine postpartum visit in 6 weeks   Wound care: Keep incision clean and dry           Discharge Disposition:     Discharged to home in stable condition      Fiona Calderon MD  OB/GYN, PGY-2  12/29/2021, 9:58 AM        Appreciate note by Dr. Calderon. Patient has been seen and examined by me separate from the resident, agree with above note.     Michelle Dorsey MD  12:28 PM

## 2021-12-27 NOTE — PROGRESS NOTES
Brief Progress Note    Requested to review strip. Patient has had minimal variability since 2:45 am without improvement despite 3 fluid boluses and multiple position changes. She was most recently 5-6cm dilated per CNM check. At this time, patient meets criteria for category 2 remote from delivery and recommendation is to move forward with a  section.    CNM discussed this with patient and she is agreeable to proceed. Discussed expectations for surgery and recovery, including surgical risks of bleeding; need for transfusion; infection; injury to uterus, fallopian tubes, ovaries, bowel, bladder, nerves, blood vessels, ureters, or baby. Additionally discussed remote risk of hysterectomy in the case of uncontrollable bleeding. Patient agreeable to proceed with surgery, written informed consent signed.    Of note, patient has been ruptured for 56 hours and has been laboring for ~ 15 hours. Increased risk of bleeding. Will have all uterotonics in the room and patient is typed and crossed for 2 units.     Lina Vincent MD  Obstetrics & Gynecology, PGY-2  2021 5:47 AM

## 2021-12-27 NOTE — ANESTHESIA PREPROCEDURE EVALUATION
Anesthesia Pre-Procedure Evaluation    Patient: Nilsa Goldman   MRN: [id number] : 1995        Preoperative Diagnosis: * No surgery found *    Procedure :           Past Medical History:   Diagnosis Date     NO ACTIVE PROBLEMS       Past Surgical History:   Procedure Laterality Date     NO HISTORY OF SURGERY       WISDOM TOOTH EXTRACTION Bilateral 2016      Allergies   Allergen Reactions     Cephalexin GI Disturbance     Resulted in anal fissure.       Penicillin G      Rash, hives      Social History     Tobacco Use     Smoking status: Never Smoker     Smokeless tobacco: Never Used     Tobacco comment: mom and dad smokes   Substance Use Topics     Alcohol use: No      Wt Readings from Last 1 Encounters:   20 59.4 kg (131 lb)        Anesthesia Evaluation   Pt has not had prior anesthetic     No history of anesthetic complications       ROS/MED HX  ENT/Pulmonary:  - neg pulmonary ROS     Neurologic:  - neg neurologic ROS     Cardiovascular:  - neg cardiovascular ROS     METS/Exercise Tolerance:     Hematologic:  - neg hematologic  ROS     Musculoskeletal:       GI/Hepatic:  - neg GI/hepatic ROS     Renal/Genitourinary:       Endo:  - neg endo ROS     Psychiatric/Substance Use:  - neg psychiatric ROS     Infectious Disease:       Malignancy:       Other:     (-) previous  and TOLAC candidate       Physical Exam    Airway        Mallampati: I   TM distance: > 3 FB   Neck ROM: full   Mouth opening: > 3 cm    Respiratory Devices and Support         Dental  no notable dental history         Cardiovascular   cardiovascular exam normal          Pulmonary   pulmonary exam normal                OUTSIDE LABS:  CBC:   Lab Results   Component Value Date    WBC 13.3 (H) 2021    WBC 9.6 2020    HGB 12.4 2021    HGB 13.5 2020    HCT 38.0 2021    HCT 39.9 2020     2021     2020     BMP:   Lab Results   Component Value Date     2011     POTASSIUM 4.6 03/24/2011    CHLORIDE 103 03/24/2011    CO2 28 03/24/2011    BUN 13 03/24/2011    CR 0.75 03/24/2011    GLC 84 12/26/2021    GLC 82 03/24/2011     COAGS: No results found for: PTT, INR, FIBR  POC: No results found for: BGM, HCG, HCGS  HEPATIC:   Lab Results   Component Value Date    ALBUMIN 4.7 03/24/2011    PROTTOTAL 7.9 03/24/2011    ALT 8 03/24/2011    AST 17 03/24/2011    ALKPHOS 63 (L) 03/24/2011    BILITOTAL 0.6 03/24/2011     OTHER:   Lab Results   Component Value Date    GAYE 10.1 03/24/2011       Anesthesia Plan    ASA Status:  2, emergent       Anesthesia Type: Epidural.              Consents    Anesthesia Plan(s) and associated risks, benefits, and realistic alternatives discussed. Questions answered and patient/representative(s) expressed understanding.    - Discussed:     - Discussed with:  Patient         Postoperative Care            Comments:    Other Comments: Discussed benefits and risks of epidural including bleeding, infection and damage to surrounding structure, back pain and spinal headache. Patient had no further questions and wishes to proceed.        neg OB ROS.       Delano Prince MD

## 2021-12-27 NOTE — PROGRESS NOTES
Labor progress note    S:  Feeling more uncomfortable with contractions. Agreeable to cervical exam     O:  Blood pressure 105/64, temperature 98.1  F (36.7  C), temperature source Oral, resp. rate 20, last menstrual period 03/08/2021.  General appearance: uncomfortable with contractions.  Contractions: Every 1-3 minutes. 20-70 seconds duration.  Palpate: strong.  FHT: Baseline 150 with minimal variability. Accelerations NOT present. Occ. early decelerations present.  ROM: light meconium fluid . Membranes have been ruptured for 46 hours.  Pelvic exam: 3/ 70%/ Mid/ soft/ -2  Braun Score: 8  -------------------------------  Pitocin- 8 mu/min.,  Antibiotics- none    A:  IUP @ 41w6d prolonged ROM; meconium stained fluid   Fetal Heart rate tracing Category two w/ minimal variability.  GBS- negative  Patient Active Problem List   Diagnosis     Undersocialized conduct disorder, aggressive type     Labor and delivery, indication for care     Pregnancy with prenatal care elsewhere         P:  comfort measures prn   Labor augmentation with Pitocin  reevaluate in 2-4 hours/PRN   Reposition off back due to minimal variability  Plan 500cc fluid bolus if position change does not resolve minimal variability.  Pain management: patient coping with labor support from partner and .   LORE Horan CNM

## 2021-12-27 NOTE — PROGRESS NOTES
S: I was notified by labor RN of persistent category II fetal heart rate tracing for >60 minutes.  Strip reviewed on unit.  I came to the bedside to review with the RN.     O: Baseline rate 150-160, normal, though rising baseline  Variability minimal  Accelerations not present  Decelerations not present    Assessment: EFM interpretation suggests concern for fetal metabolic acidemia at this time due to minimal variability and absence of accelerations.     Uterine Activity normal/regular. more than 5 contractions in 10 min averaged over a 30 min time frame ROM x56 hours with MSF.    The interventions currently taken to improve the fetal heart rate tracing and fetal oxygenation are: maternal positioning, IV fluid bolus , consult Category 2 algorithm, evaluate labor progress and emotional support    A: Persistent category II tracing.    P: Per the category II algorithm, the proceed to  section or operative vaginal birth . Patient agreeable to this plan. See MD note for further information.

## 2021-12-27 NOTE — PLAN OF CARE
VSS, postpartum assessment WNL, Fundus firm and 1 below U. Guadarrama patent and draining, ok to remove at 1800.  Denies any H/A or pre-e  symptoms. Scant amount of lochia, labia and perineum swollen- ice packs applied.  C/S incision CDI, abdominal binder in place. Ambulated in hallway this afternoon- denies any dizziness/lightheadedness.   and mother at bedside and supportive, bonding well with infant- continue with education and POC.

## 2021-12-27 NOTE — ANESTHESIA PROCEDURE NOTES
Dural puncture epidural Procedure Note    Pre-Procedure   Staff -        Anesthesiologist:  Delano Prince MD       Performed By: anesthesiologist       Location: OB       Procedure Start/Stop Times: 12/26/2021 8:57 PM and 12/26/2021 9:10 PM       Pre-Anesthestic Checklist: patient identified, IV checked, risks and benefits discussed, informed consent, monitors and equipment checked, pre-op evaluation, at physician/surgeon's request and post-op pain management  Timeout:       Correct Patient: Yes        Correct Procedure: Yes        Correct Site: Yes        Correct Position: Yes   Procedure Documentation  Procedure: dural puncture epidural       Diagnosis: labor pain       Patient Position: sitting       Skin prep: Chloraprep      Local skin infiltrated with mL of 1% lidocaine.        Insertion Site: L4-5. (midline approach).       Technique: LORT saline        ARIANNE at 5 cm.       Needle Type: Touhy       Needle Gauge: 17.        Needle Length (Inches): 3.5        Spinal Needle Type: Irina-Simone       Spinal Needle (gauge): 25        Spinal Needle Length (inches): 4.69       Catheter: 18 G.         Catheter threaded easily.         Threaded 10 cm at skin.         # of attempts: 1 and  # of redirects:  0    Assessment/Narrative         Paresthesias: No.       Test dose of 3 mL lidocaine 1.5% w/ 1:200,000 epinephrine at 21:02 CST.         Test dose negative, 3 minutes after injection, for signs of intravascular, subdural, or intrathecal injection.       Insertion/Infusion Method: LORT saline       Aspiration negative for Heme or CSF via Epidural Catheter.CSF fluid removed: with Epidural needle - not with Epidural needle and not with Spinal needle.     Comments:  Felt pop with spinal needle but no CSF flow back, catheter threaded easily.

## 2021-12-28 LAB
ERYTHROCYTE [DISTWIDTH] IN BLOOD BY AUTOMATED COUNT: 13.9 % (ref 10–15)
GLUCOSE BLDC GLUCOMTR-MCNC: 72 MG/DL (ref 70–99)
HCT VFR BLD AUTO: 32.4 % (ref 35–47)
HGB BLD-MCNC: 10.3 G/DL (ref 11.7–15.7)
MCH RBC QN AUTO: 30.1 PG (ref 26.5–33)
MCHC RBC AUTO-ENTMCNC: 31.8 G/DL (ref 31.5–36.5)
MCV RBC AUTO: 95 FL (ref 78–100)
PLATELET # BLD AUTO: 184 10E3/UL (ref 150–450)
RBC # BLD AUTO: 3.42 10E6/UL (ref 3.8–5.2)
TSH SERPL DL<=0.005 MIU/L-ACNC: 2.23 MU/L (ref 0.4–4)
WBC # BLD AUTO: 19.2 10E3/UL (ref 4–11)

## 2021-12-28 PROCEDURE — 85027 COMPLETE CBC AUTOMATED: CPT | Performed by: OBSTETRICS & GYNECOLOGY

## 2021-12-28 PROCEDURE — 250N000013 HC RX MED GY IP 250 OP 250 PS 637: Performed by: STUDENT IN AN ORGANIZED HEALTH CARE EDUCATION/TRAINING PROGRAM

## 2021-12-28 PROCEDURE — 250N000011 HC RX IP 250 OP 636: Performed by: STUDENT IN AN ORGANIZED HEALTH CARE EDUCATION/TRAINING PROGRAM

## 2021-12-28 PROCEDURE — 93005 ELECTROCARDIOGRAM TRACING: CPT

## 2021-12-28 PROCEDURE — 36415 COLL VENOUS BLD VENIPUNCTURE: CPT | Performed by: OBSTETRICS & GYNECOLOGY

## 2021-12-28 PROCEDURE — 120N000002 HC R&B MED SURG/OB UMMC

## 2021-12-28 PROCEDURE — 84443 ASSAY THYROID STIM HORMONE: CPT | Performed by: OBSTETRICS & GYNECOLOGY

## 2021-12-28 RX ORDER — ACETAMINOPHEN 325 MG/1
650 TABLET ORAL EVERY 6 HOURS PRN
Qty: 100 TABLET | Refills: 0 | Status: SHIPPED | OUTPATIENT
Start: 2021-12-28

## 2021-12-28 RX ORDER — AMOXICILLIN 250 MG
1 CAPSULE ORAL DAILY
Qty: 100 TABLET | Refills: 0 | Status: SHIPPED | OUTPATIENT
Start: 2021-12-28 | End: 2023-11-16

## 2021-12-28 RX ORDER — IBUPROFEN 600 MG/1
600 TABLET, FILM COATED ORAL EVERY 6 HOURS PRN
Qty: 60 TABLET | Refills: 0 | Status: SHIPPED | OUTPATIENT
Start: 2021-12-28 | End: 2023-11-16

## 2021-12-28 RX ORDER — DIPHENHYDRAMINE HCL 25 MG
50 CAPSULE ORAL
Status: DISCONTINUED | OUTPATIENT
Start: 2021-12-28 | End: 2021-12-29 | Stop reason: HOSPADM

## 2021-12-28 RX ORDER — DIPHENHYDRAMINE HYDROCHLORIDE 50 MG/ML
50 INJECTION INTRAMUSCULAR; INTRAVENOUS
Status: DISCONTINUED | OUTPATIENT
Start: 2021-12-28 | End: 2021-12-29 | Stop reason: HOSPADM

## 2021-12-28 RX ADMIN — KETOROLAC TROMETHAMINE 30 MG: 30 INJECTION, SOLUTION INTRAMUSCULAR at 03:17

## 2021-12-28 RX ADMIN — IBUPROFEN 800 MG: 800 TABLET, FILM COATED ORAL at 15:17

## 2021-12-28 RX ADMIN — IBUPROFEN 800 MG: 800 TABLET, FILM COATED ORAL at 22:51

## 2021-12-28 RX ADMIN — OXYCODONE HYDROCHLORIDE 5 MG: 5 TABLET ORAL at 15:17

## 2021-12-28 RX ADMIN — OXYCODONE HYDROCHLORIDE 5 MG: 5 TABLET ORAL at 06:06

## 2021-12-28 RX ADMIN — OXYCODONE HYDROCHLORIDE 5 MG: 5 TABLET ORAL at 19:14

## 2021-12-28 RX ADMIN — DOCUSATE SODIUM AND SENNOSIDES 2 TABLET: 8.6; 5 TABLET ORAL at 19:13

## 2021-12-28 RX ADMIN — OXYCODONE HYDROCHLORIDE 5 MG: 5 TABLET ORAL at 22:51

## 2021-12-28 RX ADMIN — ACETAMINOPHEN 975 MG: 325 TABLET, FILM COATED ORAL at 11:31

## 2021-12-28 RX ADMIN — ACETAMINOPHEN 975 MG: 325 TABLET, FILM COATED ORAL at 17:04

## 2021-12-28 RX ADMIN — ACETAMINOPHEN 975 MG: 325 TABLET, FILM COATED ORAL at 22:51

## 2021-12-28 RX ADMIN — IBUPROFEN 800 MG: 800 TABLET, FILM COATED ORAL at 10:00

## 2021-12-28 RX ADMIN — ACETAMINOPHEN 975 MG: 325 TABLET, FILM COATED ORAL at 00:07

## 2021-12-28 RX ADMIN — DOCUSATE SODIUM AND SENNOSIDES 1 TABLET: 8.6; 5 TABLET ORAL at 08:07

## 2021-12-28 RX ADMIN — ACETAMINOPHEN 975 MG: 325 TABLET, FILM COATED ORAL at 06:06

## 2021-12-28 RX ADMIN — OXYCODONE HYDROCHLORIDE 5 MG: 5 TABLET ORAL at 11:31

## 2021-12-28 RX ADMIN — OXYCODONE HYDROCHLORIDE 5 MG: 5 TABLET ORAL at 01:37

## 2021-12-28 NOTE — PLAN OF CARE
VSS. HR ranging 80-100s overnight. Pt reported having hot flashes & waking up drenched in sweat. Temp WNL. Fundus firm U/1, denies dizziness, headache, or vision changes. Incision covered, dressing CDI. Pain managed by tylenol, toradol, and oxycodone. Ice pack in place in perineum. Voiding w/o issues, no BM yet but passing gas. Tolerating regular diet. Breastfeeding independently, RN encouraged hand expression after each feed. Bonding well with infant. Partner at bedside and supportive. Continue with current postpartum plan of care.

## 2021-12-28 NOTE — PROGRESS NOTES
CNM courtesy visit.  Doing well reports pain is managed.  Baby is working on breastfeeding  She was able to get some sleep. No current concerns

## 2021-12-28 NOTE — PLAN OF CARE
VSS, postpartum assessment WNL.  Scant amount of lochia, denies any H/A or pre-e symptoms.  Dressing CDI, abdominal binder in place.  Pt reports increased pain today- taking scheduled Ibuprofen and Tylenol as well as PRN Oxycodone.  Breastfeeding well independently- infant has great latch.  Bonding well with infant,  at bedside and supportive.  Continue with education and POC.

## 2021-12-28 NOTE — PROGRESS NOTES
"[facility]   Postpartum Note    Name:  Nilsa Goldman  MRN: [id number]    S: Patient feeling well. Pain is well controlled. Tolerating regular diet. Ambulating without dizziness. Spontaneously voiding. Reports flatus. Lochia is moderate.  Breast feeding. Undecided for postpartum contraception.     O:   Patient Vitals for the past 24 hrs:   BP Temp Temp src Pulse Resp SpO2 Height Weight   12/28/21 0420 96/60 97.4  F (36.3  C) Oral 89 16 97 % -- --   12/28/21 0132 117/65 98.3  F (36.8  C) Oral 97 16 99 % -- --   12/28/21 0008 104/61 -- -- 103 -- -- -- --   12/27/21 2132 117/69 98  F (36.7  C) Oral (!) 121 16 99 % -- --   12/27/21 1744 116/75 98.4  F (36.9  C) Oral 114 16 98 % -- --   12/27/21 1510 113/70 -- -- 100 -- 98 % -- --   12/27/21 1400 97/51 -- -- 95 -- 98 % -- --   12/27/21 1254 114/58 -- -- 97 -- 97 % -- --   12/27/21 1200 116/61 -- -- 105 -- 97 % -- --   12/27/21 1100 113/59 -- -- 101 -- 97 % -- --   12/27/21 1000 108/54 -- -- -- -- 97 % -- --   12/27/21 0930 107/64 98.8  F (37.1  C) Oral 101 -- 96 % 1.727 m (5' 8\") 83 kg (183 lb)   12/27/21 0928 114/66 -- -- -- -- -- -- --   12/27/21 0900 108/68 -- -- 104 20 98 % -- --   12/27/21 0845 108/70 98.9  F (37.2  C) Oral (!) 121 18 99 % -- --   12/27/21 0830 107/61 -- -- 113 20 98 % -- --   12/27/21 0815 117/53 -- -- 114 20 99 % -- --   12/27/21 0800 105/65 -- -- (!) 121 20 99 % -- --   12/27/21 0745 -- -- -- -- 20 99 % -- --   12/27/21 0730 108/56 -- -- 72 -- 98 % -- --     Gen:  Resting comfortably, NAD  CV:  Regular rate, well perfused  Pulm:  Breathing room air comfortably  Abd:  Soft, appropriately ttp, non-distended. Fundus firm and below umbilicus, firm and non-tender.  Incision: C/d/i, no surrounding redness or erythema with dressing in place  Ext:  Non-tender, 1+ LE edema b/l    I/O last 3 completed shifts:  In: 5450.25 [P.O.:1280; I.V.:4170.25]  Out: 3730 [Urine:3150; Blood:580]    Hgb:   Hemoglobin   Date Value Ref " Range Status   2021 10.0 (L) 11.7 - 15.7 g/dL Final   2020 13.5 11.7 - 15.7 g/dL Final       Assessment/Plan:  26 year old  on POD#1 s/p PLTCS for category 2 RFD.    Routine postpartum care:  Pain: Well-controlled with ibuprofen, tylenol, oxycodone prn  Hgb: 12.4>  (TXA) > 10.0 > 10.3. Patient is asymptomatic from acute blood loss anemia secondary to procedural blood loss and vitals are reassuring. Will discharge home with PO iron if Hgb <10.  Rh: NEGATIVE, baby Rh pos - rhogam eval pending  Imm:  Rubella imm. Patient due for COVID booster, agreeable to receive prior to discharge - ordered.  Feed: Breast  BC: Undecided    Tachycardia  - Asymptomatic  - EKG wnl (), WBC 19.5    Dispo: Discharge on POD#2-3 pending postop goals.    Lina Vincent MD  Obstetrics & Gynecology, PGY-2  2021 6:35 AM    Staff MD Note    I appreciate the note by Dr. Vincent.  Any necessary changes have been made by me.  I saw and evaluated the patient and agree with the findings and plan of care as documented in the note.    Bernadine Rios MD

## 2021-12-28 NOTE — PLAN OF CARE
[id number]: VSS and postpartum assessments WDL. Pt is tachycardic (See chart flow) and provider updated and ordered stat EKG. Voiding without difficulty and tolerating regular diet. Ambulating with standby assist. Bonding well with infant. Breastfeeding on cue independently. Pain managed with tylenol, Toradol and Oxycodone. Partner present and supportive. Will continue with postpartum cares and education per plan of care.

## 2021-12-28 NOTE — LACTATION NOTE
This note was copied from a baby's chart.  Asked by provider to check in with first time breastfeeding mom who requested to see lactation, 24 hours after  delivery and breastfeeding going well.     Infant delivered at 42w0d,  due to fetal status. LGA @ 8# 10.3 ounce birthweight, 5.1% loss at 24 hours, low risk serum bilirubin and all BG checks WNL. They planned to deliver at [facility], transferred care due to PROM without labor. No significant medical history for mom.    Breasts soft, symmetric with intact, everted nipples bilaterally without redness. Nilsa is now latching independently and denies pain with feedings.    Infant latched during visit, self attached in semi reclined position. Latch was somewhat shallow but mom denied pain. With permission, demo difference between shallow and deeper latch, showing how to lift nipple above nose and get more of lower areola in mouth. Nilsa able to see and feel difference, felt a stronger, yet fully comfortable draw on breast while feeding. Able to point out difference between nutritiive suck and non.     Feeding plan: encourage continued feedings on cue working on deep latch, hand express after most and feed back results. Will check in tomorrow, plan to review outpatient resources and feeding log at that time.

## 2021-12-28 NOTE — PLAN OF CARE
VSS. Oxycodone given x1 for breakthrough pain. Ambulated to bathroom with x1 assist. Guadarrama catheter removed, due to void at 10pm. Tolerating oral intake.

## 2021-12-28 NOTE — PROVIDER NOTIFICATION
12/27/21 2247   Provider Notification   Provider Name/Title G2   Method of Notification Electronic Page   Request Evaluate-Remote   Notification Reason Status Update     FYI Pt is tachycardic (Pulse 121)    Thank you

## 2021-12-29 VITALS
OXYGEN SATURATION: 99 % | HEART RATE: 96 BPM | DIASTOLIC BLOOD PRESSURE: 79 MMHG | WEIGHT: 183 LBS | RESPIRATION RATE: 16 BRPM | SYSTOLIC BLOOD PRESSURE: 114 MMHG | HEIGHT: 68 IN | BODY MASS INDEX: 27.74 KG/M2 | TEMPERATURE: 97.8 F

## 2021-12-29 LAB
ATRIAL RATE - MUSE: 96 BPM
DIASTOLIC BLOOD PRESSURE - MUSE: NORMAL MMHG
INTERPRETATION ECG - MUSE: NORMAL
P AXIS - MUSE: 56 DEGREES
PR INTERVAL - MUSE: 156 MS
QRS DURATION - MUSE: 76 MS
QT - MUSE: 342 MS
QTC - MUSE: 432 MS
R AXIS - MUSE: 34 DEGREES
SYSTOLIC BLOOD PRESSURE - MUSE: NORMAL MMHG
T AXIS - MUSE: 15 DEGREES
VENTRICULAR RATE- MUSE: 96 BPM

## 2021-12-29 PROCEDURE — 250N000013 HC RX MED GY IP 250 OP 250 PS 637: Performed by: STUDENT IN AN ORGANIZED HEALTH CARE EDUCATION/TRAINING PROGRAM

## 2021-12-29 RX ORDER — OXYCODONE HYDROCHLORIDE 5 MG/1
5 TABLET ORAL EVERY 6 HOURS PRN
Qty: 18 TABLET | Refills: 0 | Status: SHIPPED | OUTPATIENT
Start: 2021-12-29 | End: 2023-08-28

## 2021-12-29 RX ORDER — ACETAMINOPHEN AND CODEINE PHOSPHATE 120; 12 MG/5ML; MG/5ML
0.35 SOLUTION ORAL DAILY
Qty: 90 TABLET | Refills: 3 | Status: SHIPPED | OUTPATIENT
Start: 2021-12-29 | End: 2023-11-16

## 2021-12-29 RX ADMIN — ACETAMINOPHEN 975 MG: 325 TABLET, FILM COATED ORAL at 05:40

## 2021-12-29 RX ADMIN — OXYCODONE HYDROCHLORIDE 5 MG: 5 TABLET ORAL at 02:52

## 2021-12-29 RX ADMIN — IBUPROFEN 800 MG: 800 TABLET, FILM COATED ORAL at 05:10

## 2021-12-29 RX ADMIN — ACETAMINOPHEN 975 MG: 325 TABLET, FILM COATED ORAL at 11:35

## 2021-12-29 RX ADMIN — SIMETHICONE CHEW TAB 80 MG 80 MG: 80 TABLET ORAL at 08:19

## 2021-12-29 RX ADMIN — OXYCODONE HYDROCHLORIDE 5 MG: 5 TABLET ORAL at 10:40

## 2021-12-29 RX ADMIN — DOCUSATE SODIUM AND SENNOSIDES 2 TABLET: 8.6; 5 TABLET ORAL at 08:18

## 2021-12-29 RX ADMIN — IBUPROFEN 800 MG: 800 TABLET, FILM COATED ORAL at 11:35

## 2021-12-29 NOTE — PROGRESS NOTES
[facility]   Postpartum Note, POD#2    Name:  Nilsa Goldman  MRN: [id number]    S: Patient feeling well. Pain is well controlled. Tolerating regular diet. Ambulating without dizziness. Spontaneously voiding. Reports flatus. Lochia is minimal.  Breast feeding. Desires POPs for postpartum contraception. She is interested in discharge today if possible.    O:   Patient Vitals for the past 24 hrs:   BP Temp Temp src Pulse Resp   21 0817 114/79 97.8  F (36.6  C) Oral 96 16   21 0100 116/73 98.1  F (36.7  C) Oral 102 16   21 1700 115/74 97.7  F (36.5  C) Oral 106 16     Gen:  Resting comfortably, NAD  CV:  Regular rate, well perfused  Pulm:  Breathing room air comfortably  Abd:  Soft, appropriately ttp, non-distended. Fundus firm and below umbilicus, firm and non-tender.  Incision: C/d/i, no surrounding redness or erythema with dressing in place  Ext:  Non-tender, 1+ LE edema b/l    I/O last 3 completed shifts:  In: 1400 [P.O.:1400]  Out: 2150 [Urine:2150]    Hgb:   Hemoglobin   Date Value Ref Range Status   2021 10.3 (L) 11.7 - 15.7 g/dL Final   2020 13.5 11.7 - 15.7 g/dL Final       Assessment/Plan:  26 year old  on POD#2 s/p PLTCS for category 2 RFD.    Routine postpartum care:  Pain: Well-controlled with ibuprofen, tylenol, oxycodone prn  Hgb: 12.4>  (TXA) > 10.0 > 10.3. Patient is asymptomatic from acute blood loss anemia secondary to procedural blood loss and vitals are reassuring.  Rh: NEGATIVE, baby Rh pos - s/p rhogam  Imm:  Rubella imm. Patient due for COVID booster. She is interested in recovering from her surgery before receiving her booster as her first and second doses caused her to experience cold-like symptoms for 2 weeks. She plans to get it in the postpartum period.   Feed: Breast  BC: POPs    Tachycardia  Had mild tachycardia in postop setting, now resolved. WBC stable. TSH wnl. EKG wnl.   - continue to monitor given increased  risk of endometritis w/ prolonged ROM    Dispo: Discharge on POD#2-3 pending postop goals, possibly today.    Pinky Spence MD MPH   OBGYN PGY-1  12/29/21  7:00 AM    Appreciate note by Dr. Paulette Spence. Patient has been seen and examined by me separate from the resident, agree with above note. Discharge home today.    Michelle Dorsey MD  12:31 PM

## 2021-12-29 NOTE — PLAN OF CARE
Data: Vital signs within normal limits. Postpartum checks within normal limits - see flow record. Patient eating and drinking normally. Patient able to empty bladder independently and is up ambulating. No apparent signs of infection. Incision healing well. Patient performing self cares and is able to care for infant.  Action: Patient medicated during the shift for pain and cramping. See MAR. Patient reassessed within 1 hour after each medication and pain was improved - patient stated she was comfortable. Patient education done about pain control, incision cares, recovery from csection. See flow record.  Response: Positive attachment behaviors observed with infant. Support persons are present.   Plan: Anticipate discharge on Wed/Thurs.

## 2021-12-29 NOTE — PLAN OF CARE
Patient is discharge to home today with baby and discharge education and instructions were reviewed and questions were addressed.

## 2021-12-29 NOTE — PLAN OF CARE
VSS. Ambulating and voiding without issues. Incision closed w/ steri strips, site CARMEN and no signs of infection. Pain managed w/ tylenol, ibuprofen, and oxycodone. Breastfeeding infant ~2-3 hours on cue. Partner at bedside and supportive. Both are bonding well with infant. Positive reinforcements observed. Continue with current postpartum plan of care.

## 2021-12-29 NOTE — DISCHARGE INSTRUCTIONS
Section ()  A  section () is the surgical delivery of a baby through an incision in the mother s belly. A  may be planned and scheduled. But, in many cases, a  is unexpected. In any case, it is done to make sure that you and your baby have the safest birth.    Before the procedure  You will be given antibiotics before the . This is to reduce any risk of infection. Most C-sections are completed in less than 1 hour. During the birth, your healthcare team is with you, ready to take care of you and your . Your partner may also be with you for the birth.   During the procedure  Surgery will begin shortly after you receive anesthesia. You will receive either regional or general anesthesia. Incisions are made in both your skin and then your uterus. Your skin and uterine incisions may differ. Be sure they are noted in your health records. Here is how they may vary:     The skin incision. This is usually transverse (side to side). It is located at the pubic hairline. A vertical (up and down) incision may be used if you ve had this incision before or if the  needs to be done quickly.    The uterine incision. This is almost always transverse. A transverse incision heals very well. This may allow for a future vaginal birth (). In certain cases, a vertical uterine incision may be made.   Once the incisions are made, the healthcare provider presses on the top of the uterus and guides the baby through the incision. The cord will be clamped and cut. Then the placenta is lifted out through the incision.      Possible sites for a transverse skin incision or a vertical skin incision.     After the procedure  After your baby s birth, the uterine incision is closed with stitches. Your skin incision will be closed with surgical staples or stitches and a dressing will be applied. Your healthcare provider will press on your uterus. This helps expel blood  clots through the vagina. You may be given medicines to help shrink your uterus and decrease bleeding.   Your baby's care after birth  While your surgery is completed, your baby will be placed in an infant warmer. Gentle suction will be used to help remove excess fluid from your baby s mouth and airways. Then the APGAR score will be done. This rates a baby s appearance (skin color showing healthy blood flow), pulse (heart rate), grimace (muscle reflex), activity (muscle tone), and respiration. Your baby will be wrapped in a blanket and brought to you. Now, for the first time, you ll see your .   Risks of a   As with any surgery, a  has risks. Your healthcare provider will discuss the risks with you. They may include:     Bleeding    Infection    Injury to nearby organs    Blood clots in the legs, pelvis, or lungs    Reaction to anesthesia  Michelet last reviewed this educational content on 2020-2021 [employer]. All rights reserved. This information is not intended as a substitute for professional medical care. Always follow your healthcare professional's instructions.    Postop  Birth Instructions    Activity       Do not lift more than 10 pounds for 6 weeks after surgery.  Ask family and friends for help when you need it.    No driving until you have stopped taking your pain medications (usually two weeks after surgery).    No heavy exercise or activity for 6 weeks.  Don't do anything that will put a strain on your surgery site.    Don't strain when using the toilet.  Your care team may prescribe a stool softener if you have problems with your bowel movements.     To care for your incision:       Keep the incision clean and dry.    Do not soak your incision in water. No swimming or hot tubs until it has fully healed. You may soak in the bathtub if the water level is below your incision.    Do not use peroxide, gel, cream, lotion, or ointment on your  incision.    Adjust your clothes to avoid pressure on your surgery site (check the elastic in your underwear for example).     You may see a small amount of clear or pink drainage and this is normal.  Check with your health care provider:       If the drainage increases or has an odor.    If the incision reddens, you have swelling, or develop a rash.    If you have increased pain and the medicine we prescribed doesn't help.    If you have a fever above 100.4 F (38 C) with or without chills when placing thermometer under your tongue.   The area around your incision (surgery wound), will feel numb.  This is normal. The numbness should go away in less than a year.     Keep your hands clean:  Always wash your hands before touching your incision (surgery wound). This helps reduce your risk of infection. If your hands aren't dirty, you may use an alcohol hand-rub to clean your hands. Keep your nails clean and short.    Call your healthcare provider if you have any of these symptoms:       You soak a sanitary pad with blood within 1 hour, or you see blood clots larger than a golf ball.    Bleeding that lasts more than 6 weeks.    Vaginal discharge that smells bad.    Severe pain, cramping or tenderness in your lower belly area.    A need to urinate more frequently (use the toilet more often), more urgently (use the toilet very quickly), or it burns when you urinate.    Nausea and vomiting.    Redness, swelling or pain around a vein in your leg.    Problems breastfeeding or a red or painful area on your breast.    Chest pain and cough or are gasping for air.    Problems with coping with sadness, anxiety or depression. If you have concerns about hurting yourself or the baby, call your provider immediately.      You have questions or concerns after you return home.

## 2022-03-24 ENCOUNTER — TELEPHONE (OUTPATIENT)
Dept: OBGYN | Facility: CLINIC | Age: 27
End: 2022-03-24
Payer: OTHER GOVERNMENT

## 2022-03-24 NOTE — TELEPHONE ENCOUNTER
Received fax for Breast Pump RX to be completed.  requests the baby date of birth on breast pump RX.     Fax placed in Dr. Dorsey's basket.     - Meka Orourke

## 2022-04-04 ENCOUNTER — TELEPHONE (OUTPATIENT)
Dept: OBGYN | Facility: CLINIC | Age: 27
End: 2022-04-04
Payer: OTHER GOVERNMENT

## 2022-04-04 NOTE — TELEPHONE ENCOUNTER
Orders completed signed and faxed today,      Received fax requesting the baby's date of birth on a breast pump order.     Fax placed in Dr. Dorsey's basket.     - Meka Orourke

## 2022-05-10 ENCOUNTER — DOCUMENTATION ONLY (OUTPATIENT)
Dept: OBGYN | Facility: CLINIC | Age: 27
End: 2022-05-10
Payer: OTHER GOVERNMENT

## 2023-08-28 ENCOUNTER — OFFICE VISIT (OUTPATIENT)
Dept: URGENT CARE | Facility: URGENT CARE | Age: 28
End: 2023-08-28
Payer: OTHER GOVERNMENT

## 2023-08-28 VITALS
HEART RATE: 92 BPM | OXYGEN SATURATION: 97 % | BODY MASS INDEX: 27.83 KG/M2 | SYSTOLIC BLOOD PRESSURE: 118 MMHG | RESPIRATION RATE: 14 BRPM | DIASTOLIC BLOOD PRESSURE: 72 MMHG | WEIGHT: 183 LBS | TEMPERATURE: 97.9 F

## 2023-08-28 DIAGNOSIS — M25.512 ACUTE PAIN OF LEFT SHOULDER: Primary | ICD-10-CM

## 2023-08-28 PROBLEM — J18.9 COMMUNITY ACQUIRED PNEUMONIA: Status: ACTIVE | Noted: 2023-08-23

## 2023-08-28 PROBLEM — Z34.90 PREGNANCY WITH PRENATAL CARE ELSEWHERE: Status: RESOLVED | Noted: 2021-12-26 | Resolved: 2023-08-28

## 2023-08-28 PROBLEM — R87.612 LOW GRADE SQUAMOUS INTRAEPITHELIAL LESION ON CYTOLOGIC SMEAR OF CERVIX (LGSIL): Status: ACTIVE | Noted: 2021-04-30

## 2023-08-28 PROBLEM — R09.1 PLEURITIS: Status: ACTIVE | Noted: 2023-08-23

## 2023-08-28 PROCEDURE — 99203 OFFICE O/P NEW LOW 30 MIN: CPT | Performed by: PHYSICIAN ASSISTANT

## 2023-08-28 RX ORDER — HYDROMORPHONE HYDROCHLORIDE 2 MG/1
TABLET ORAL
COMMUNITY
Start: 2023-08-24 | End: 2023-11-16

## 2023-08-28 RX ORDER — DICLOFENAC SODIUM 75 MG/1
75 TABLET, DELAYED RELEASE ORAL 2 TIMES DAILY
Qty: 20 TABLET | Refills: 0 | Status: SHIPPED | OUTPATIENT
Start: 2023-08-28

## 2023-08-28 NOTE — LETTER
August 28, 2023      Nilsa Lee  [id number]   [facility] [address]        To Whom It May Concern:    Nilsa Lee was seen in our clinic. She may return to work 8/29/23. Please allow patient a duty where she is not using her left arm. She is not to lift more than 5# or have repetitive movement with her left arm. Restrictions for one week, 8/29/23 - 9/5/23.       Sincerely,        Marzena Lam PA-C

## 2023-08-28 NOTE — PROGRESS NOTES
Assessment & Plan     1. Acute pain of left shoulder  No fracture noted on x-ray performed 1 week ago, patient still in pain.  She was given a note for restrictions at work to lift no more than 5 pounds and to avoid repetitive motion.  Referral was given to orthopedic/sports medicine for shoulder pain.  For pain management, prescription for diclofenac.  Encouraged her to use ice/heat on the area lightly stretch the area.  - Orthopedic  Referral; Future  - diclofenac (VOLTAREN) 75 MG EC tablet; Take 1 tablet (75 mg) by mouth 2 times daily  Dispense: 20 tablet; Refill: 0        Return in about 5 days (around 2023) for Sports Med / Orthopedics.    Diagnosis and treatment plan was reviewed with patient and/or family.   Patient verbalizes understanding. All questions were addressed and answered.     Marzena Lam PA-C  [facility]    CHIEF COMPLAINT:   Chief Complaint   Patient presents with    Work Comp     Work comp -- let arm pain x 22 AUG -- xray was done at [facility] in [facility] and was given pain med and a sling -- STILL having pain  and may need a DR note-- restrictions need to be clarified    Letter Out     For restrictions     Subjective     Nilsa is a 28 year old right handed female who presents to clinic today for evaluation of left shoulder pain.  Symptoms started on , she was at work, bent over and stood up quickly.  She had severe pain in her left shoulder which radiated down her back and down her arm.  She endorses having intermittent tingling in her arm as well.  On the evening of this injury, she was admitted to the hospital for multifocal pneumonia.    Patient denies having fever, chills, pale or cold extremity.       Past Medical History:   Diagnosis Date    NO ACTIVE PROBLEMS      Past Surgical History:   Procedure Laterality Date     SECTION N/A 2021    Procedure:  SECTION;  Surgeon: Michelle Dorsey MD;   Location: [facility] L[facility]    NO HISTORY OF SURGERY      WISDOM TOOTH EXTRACTION Bilateral 2016     Social History     Tobacco Use    Smoking status: Never    Smokeless tobacco: Never    Tobacco comments:     mom and dad smokes   Substance Use Topics    Alcohol use: No     Current Outpatient Medications   Medication    acetaminophen (TYLENOL) 325 MG tablet    diclofenac (VOLTAREN) 75 MG EC tablet    HYDROmorphone (DILAUDID) 2 MG tablet    albuterol (PROAIR HFA/PROVENTIL HFA/VENTOLIN HFA) 108 (90 Base) MCG/ACT inhaler    azithromycin (ZITHROMAX) 500 MG tablet    ibuprofen (ADVIL/MOTRIN) 600 MG tablet    norethindrone (MICRONOR) 0.35 MG tablet    pramoxine 1 % RE foam    Prenatal Vit-Fe Fumarate-FA (PRENATAL MULTIVITAMIN W/IRON) 27-0.8 MG tablet    senna-docusate (SENOKOT-S/PERICOLACE) 8.6-50 MG tablet     No current facility-administered medications for this visit.     Allergies   Allergen Reactions    Cephalexin GI Disturbance     Resulted in anal fissure.      Penicillin G      Rash, hives       10 point ROS of systems were all negative except for pertinent positives noted in my HPI.      Exam:   /72 (BP Location: Right arm, Patient Position: Chair, Cuff Size: Adult Regular)   Pulse 92   Temp 97.9  F (36.6  C) (Oral)   Resp 14   Wt 83 kg (183 lb)   LMP 08/03/2023 (Exact Date)   SpO2 97%   Breastfeeding No   BMI 27.83 kg/m    Gen: healthy,alert,no distress  Extremity: Left shoulder has pain anteriorly, and over the medial border of scapula.  She has full range of motion, but has pain with overhead abduction.   There is not compromise to the distal circulation.  Pulses are +2 and CRT is brisk  Chst: CTAB  EXTREMITIES: peripheral pulses normal  SKIN: no suspicious lesions or rashes  NEURO: Normal strength and tone, sensory exam grossly normal, mentation intact and speech normal    No results found for any visits on 08/28/23.

## 2023-11-16 PROBLEM — J18.9 COMMUNITY ACQUIRED PNEUMONIA: Status: RESOLVED | Noted: 2023-08-23 | Resolved: 2023-11-16

## 2024-06-03 ENCOUNTER — HOSPITAL ENCOUNTER (EMERGENCY)
Facility: CLINIC | Age: 29
Discharge: HOME | End: 2024-06-03
Payer: OTHER GOVERNMENT

## 2024-06-03 VITALS
OXYGEN SATURATION: 99 % | DIASTOLIC BLOOD PRESSURE: 75 MMHG | TEMPERATURE: 98.5 F | HEIGHT: 68 IN | RESPIRATION RATE: 18 BRPM | BODY MASS INDEX: 21.32 KG/M2 | HEART RATE: 95 BPM | WEIGHT: 140.65 LBS | SYSTOLIC BLOOD PRESSURE: 135 MMHG

## 2024-06-03 DIAGNOSIS — M54.42 ACUTE BILATERAL LOW BACK PAIN WITH BILATERAL SCIATICA: ICD-10-CM

## 2024-06-03 DIAGNOSIS — M54.41 ACUTE BILATERAL LOW BACK PAIN WITH BILATERAL SCIATICA: ICD-10-CM

## 2024-06-03 PROCEDURE — 250N000012 HC RX MED GY IP 250 OP 636 PS 637

## 2024-06-03 PROCEDURE — 99284 EMERGENCY DEPT VISIT MOD MDM: CPT

## 2024-06-03 PROCEDURE — 250N000013 HC RX MED GY IP 250 OP 250 PS 637: Performed by: EMERGENCY MEDICINE

## 2024-06-03 PROCEDURE — 250N000013 HC RX MED GY IP 250 OP 250 PS 637

## 2024-06-03 RX ORDER — LIDOCAINE 4 G/G
1 PATCH TOPICAL ONCE
Status: DISCONTINUED | OUTPATIENT
Start: 2024-06-03 | End: 2024-06-04 | Stop reason: HOSPADM

## 2024-06-03 RX ORDER — IBUPROFEN 600 MG/1
600 TABLET, FILM COATED ORAL ONCE
Status: COMPLETED | OUTPATIENT
Start: 2024-06-03 | End: 2024-06-03

## 2024-06-03 RX ORDER — PREDNISONE 20 MG/1
40 TABLET ORAL ONCE
Status: COMPLETED | OUTPATIENT
Start: 2024-06-03 | End: 2024-06-03

## 2024-06-03 RX ORDER — LIDOCAINE 50 MG/G
1 PATCH TOPICAL EVERY 24 HOURS
Qty: 12 PATCH | Refills: 0 | Status: SHIPPED | OUTPATIENT
Start: 2024-06-03

## 2024-06-03 RX ORDER — PREDNISONE 20 MG/1
40 TABLET ORAL DAILY
Qty: 8 TABLET | Refills: 0 | Status: SHIPPED | OUTPATIENT
Start: 2024-06-03 | End: 2024-06-07

## 2024-06-03 RX ORDER — METHOCARBAMOL 750 MG/1
750 TABLET, FILM COATED ORAL 4 TIMES DAILY PRN
Qty: 20 TABLET | Refills: 0 | Status: SHIPPED | OUTPATIENT
Start: 2024-06-03

## 2024-06-03 RX ORDER — METHOCARBAMOL 500 MG/1
500 TABLET, FILM COATED ORAL 4 TIMES DAILY
Status: DISCONTINUED | OUTPATIENT
Start: 2024-06-04 | End: 2024-06-04 | Stop reason: HOSPADM

## 2024-06-03 RX ADMIN — LIDOCAINE 1 PATCH: 560 PATCH PERCUTANEOUS; TOPICAL; TRANSDERMAL at 21:53

## 2024-06-03 RX ADMIN — PREDNISONE 40 MG: 20 TABLET ORAL at 22:51

## 2024-06-03 RX ADMIN — IBUPROFEN 600 MG: 600 TABLET ORAL at 22:51

## 2024-06-03 ASSESSMENT — COLUMBIA-SUICIDE SEVERITY RATING SCALE - C-SSRS
6. HAVE YOU EVER DONE ANYTHING, STARTED TO DO ANYTHING, OR PREPARED TO DO ANYTHING TO END YOUR LIFE?: NO
2. HAVE YOU ACTUALLY HAD ANY THOUGHTS OF KILLING YOURSELF IN THE PAST MONTH?: NO
1. IN THE PAST MONTH, HAVE YOU WISHED YOU WERE DEAD OR WISHED YOU COULD GO TO SLEEP AND NOT WAKE UP?: NO

## 2024-06-03 ASSESSMENT — ACTIVITIES OF DAILY LIVING (ADL): ADLS_ACUITY_SCORE: 35

## 2024-06-03 NOTE — Clinical Note
Nilsa Lee was seen and treated in our emergency department on 6/3/2024.  She may return to work on 06/06/2024.       If you have any questions or concerns, please don't hesitate to call.      Jaylin Armas PA-C

## 2024-06-04 NOTE — ED TRIAGE NOTES
Presents to ED with c/o low back pain that radiates into bilateral hips. Patient initially hurt her back about a week ago picking up a box and got a massage and was feeling better. Today she picked up a child and her pain returned.

## 2024-06-04 NOTE — ED PROVIDER NOTES
"  Emergency Department Note      History of Present Illness     Chief Complaint  Back Pain    HPI  Nilsa Lee is a 29 year old female who presents at the emergency department for evaluation of back pain. The patient reports that she injured her lower back a week ago while picking up her daughter. Nilsa explains that 2 days following, she received massage therapy, cupping, and chiropractic care which alleviated her back pain while soreness in her hips persisted. She states that she has experienced shooting \"pins-and-needles\" pain that starts centralized in her lower back and radiates down through the front of the hips and thighs. She denies leg numbness, urinary symptoms, or bowel troubles. Nilsa has been using Tylenol/ibuprofen at home to manage symptoms, taking this medications as needed. Patient reports no direct trauma to the region or history of of spinal surgery. Of note, patient is in the  and begins annual training at the end of the week.    Independent Historian  None    Review of External Notes  None    Past Medical History   Medical History and Problem List  Anal fissure  History of bacterial infection  LGSIL  Pleuritis    Medications  Voltaren  Albuterol  Orthocyclen  Colace  Toradol    Surgical History    section  [place] tooth extraction  Physical Exam   Patient Vitals for the past 24 hrs:   BP Temp Temp src Pulse Resp SpO2 Height Weight   24 135/75 98.5  F (36.9  C) Temporal 95 18 99 % 1.727 m (5' 8\") 63.8 kg (140 lb 10.5 oz)     Physical Exam  /75   Pulse 95   Temp 98.5  F (36.9  C) (Temporal)   Resp 18   Ht 1.727 m (5' 8\")   Wt 63.8 kg (140 lb 10.5 oz)   SpO2 99%   BMI 21.39 kg/m     General: Pleasant young female. Examined in room 42.   Head: Atraumatic. Normocephalic.  EENT: PERRL. EOMI. Moist mucus membranes.   CV: Regular rate and rhythm.   Respiratory: Breathing comfortably on room air. Lungs clear to auscultation bilaterally without wheezes, " rhonchi, or rales.  GI: Soft, non-distended. Non-tender abdomen. No rebound, rigidity, or guarding.   Msk: Tenderness to palpation of the bilateral hips, bilateral quadriceps, bilateral lumbar paraspinal muscles.  No step-offs, crepitus, or deformity.    Skin: Warm and dry. No rashes, specifically to the back.  Neuro: Awake, alert, and conversant. Strength 5/5 and symmetrical with dorsiflexion, plantarflexion, knee extension and flexion.  Sensation intact to light touch throughout.  Gait stable.  Psych: Appropriate mood and affect.   Diagnostics   Lab Results   Labs Ordered and Resulted from Time of ED Arrival to Time of ED Departure - No data to display    Imaging  No orders to display     Independent Interpretation  None  ED Course    Medications Administered  Medications   Lidocaine (LIDOCARE) 4 % Patch 1 patch (1 patch Transdermal $Patch/Med Applied 6/3/24 2153)   methocarbamol (ROBAXIN) tablet 500 mg (has no administration in time range)   ibuprofen (ADVIL/MOTRIN) tablet 600 mg (600 mg Oral $Given 6/3/24 2251)   predniSONE (DELTASONE) tablet 40 mg (40 mg Oral $Given 6/3/24 2251)     Discussion of Management  None    Social Determinants of Health adding to complexity of care  None    ED Course  ED Course as of 06/03/24 2302   Mon Jun 03, 2024 2244 I obtained history and examined the patient as noted above     2301 I rechecked the patient and explained findings. The patient is comfortable with discharge.      Medical Decision Making / Diagnosis   CMS Diagnoses: None    MIPS     None    [facility]  Nilsa Lee is a 29 year old female presenting today for evaluation of bilateral low back pain radiating to the thighs.  On arrival, vital signs are stable.  Differential included cauda equina syndrome, lumbar radiculopathy, musculoskeletal strain, spinal epidural abscess, fracture.  Without any direct trauma, doubt fracture.  History not consistent with spinal epidural abscess.  She is having any bowel or bladder  incontinence, urinary retention, bilateral numbness or weakness to suggest cauda equina syndrome.  Suspect this is of a musculoskeletal etiology.  Patient expresses concerns regarding her career and upcoming training for the , I have recommended scheduled Tylenol and ibuprofen, Robaxin as needed, 5-day course of prednisone and lidocaine patches.  She will also follow-up with physical therapy.  I recommended she follow-up with an occupational therapist regarding outlining specific things she can and cannot participate in for this upcoming 2 weeks of  training.  She will certainly return here for urinary retention, bowel or bladder incontinence, numbness or weakness in the legs.    Disposition  The patient was discharged.     ICD-10 Codes:    ICD-10-CM    1. Acute bilateral low back pain with bilateral sciatica  M54.42 Physical Therapy  Referral    M54.41 Occupational Medicine Referral           Discharge Medications  Discharge Medication List as of 6/3/2024 10:57 PM        START taking these medications    Details   lidocaine (LIDODERM) 5 % patch Place 1 patch onto the skin every 24 hours To prevent lidocaine toxicity, patient should be patch free for 12 hrs daily.Disp-12 patch, [id number]      methocarbamol (ROBAXIN) 750 MG tablet Take 1 tablet (750 mg) by mouth 4 times daily as needed for muscle spasms, Disp-20 tablet, R-0, E-Prescribe      predniSONE (DELTASONE) 20 MG tablet Take 2 tablets (40 mg) by mouth daily for 4 days Take two tablets (= 40mg) each day for 4 (four) days, Disp-8 tablet, R-0, E-Prescribe               Scribe Disclosure:  I, Nilay Hardin, am serving as a scribe at 10:17 PM on 6/3/2024 to document services personally performed by Jaylin Armas PA-C based on my observations and the provider's statements to me.        Jaylin Armas PA-C  06/03/24 5003

## 2024-06-04 NOTE — DISCHARGE INSTRUCTIONS
Consider follow up with Occupational therapy -- consult placed but you can look for other providers that are labeled as this.  These health care professionals are great at the specific guidelines your mentioned  Ibuprofen 600 mg every 6 hours  Tylenol 1000 mg every 8 hours  Lidocaine patch -- on for 12 hours and off for 12 hours  Prednisone 2 tablets for 4 additional days  Robaxin 4 times daily as needed for muscle spasms  Physical therapy  Follow up with primary care  Return for loss of control over bowel or bladder, numbness or weakness in lower extremities    Discharge Instructions  Back Pain  You were seen today for back pain. Back pain can have many causes, but most will get better without surgery or other specific treatment. Sometimes there is a herniated ( slipped ) disc. We do not usually do MRI scans to look for these right away, since most herniated discs will get better on their own with time.  Today, we did not find any evidence that your back pain was caused by a serious condition. However, sometimes symptoms develop over time and cannot be found during an emergency visit, so it is very important that you follow up with your primary provider.  Generally, every Emergency Department visit should have a follow-up clinic visit with either a primary or a specialty clinic/provider. Please follow-up as instructed by your emergency provider today.    Return to the Emergency Department if:  You develop a fever with your back pain.   You have weakness or change in sensation in one or both legs.  You lose control of your bowels or bladder, or cannot empty your bladder (cannot pee).  Your pain gets much worse.     Follow-up with your provider:  Unless your pain has completely gone away, please make an appointment with your provider within one week. Most of the routine care for back pain is available in a clinic and not the Emergency Department. You may need further management of your back pain, such as more pain  medication, imaging such as an X-ray or MRI, or physical therapy.    What can I do to help myself?  Remain Active -- People are often afraid that they will hurt their back further or delay recovery by remaining active, but this is one of the best things you can do for your back. In fact, staying in bed for a long time to rest is not recommended. Studies have shown that people with low back pain recover faster when they remain active. Movement helps to bring blood flow to the muscles and relieve muscle spasms as well as preventing loss of muscle strength.  Heat -- Using a heating pad can help with low back pain during the first few weeks. Do not sleep with a heating pad, as you can be burned.   Pain medications - You may take a pain medication such as Tylenol  (acetaminophen), Advil , Motrin  (ibuprofen) or Aleve  (naproxen).  If you were given a prescription for medicine here today, be sure to read all of the information (including the package insert) that comes with your prescription.  This will include important information about the medicine, its side effects, and any warnings that you need to know about.  The pharmacist who fills the prescription can provide more information and answer questions you may have about the medicine.  If you have questions or concerns that the pharmacist cannot address, please call or return to the Emergency Department.   Remember that you can always come back to the Emergency Department if you are not able to see your regular provider in the amount of time listed above, if you get any new symptoms, or if there is anything that worries you.

## 2024-06-05 ENCOUNTER — TRANSFERRED RECORDS (OUTPATIENT)
Dept: HEALTH INFORMATION MANAGEMENT | Facility: CLINIC | Age: 29
End: 2024-06-05
Payer: OTHER GOVERNMENT

## 2024-07-12 ENCOUNTER — HOSPITAL ENCOUNTER (EMERGENCY)
Facility: CLINIC | Age: 29
Discharge: HOME | End: 2024-07-12
Attending: EMERGENCY MEDICINE | Admitting: EMERGENCY MEDICINE
Payer: OTHER GOVERNMENT

## 2024-07-12 ENCOUNTER — APPOINTMENT (OUTPATIENT)
Dept: ULTRASOUND IMAGING | Facility: CLINIC | Age: 29
End: 2024-07-12
Attending: EMERGENCY MEDICINE
Payer: OTHER GOVERNMENT

## 2024-07-12 VITALS
RESPIRATION RATE: 20 BRPM | WEIGHT: 143.08 LBS | TEMPERATURE: 97.5 F | DIASTOLIC BLOOD PRESSURE: 80 MMHG | SYSTOLIC BLOOD PRESSURE: 117 MMHG | BODY MASS INDEX: 21.76 KG/M2 | OXYGEN SATURATION: 99 % | HEART RATE: 79 BPM

## 2024-07-12 DIAGNOSIS — O46.90 VAGINAL BLEEDING IN PREGNANCY: ICD-10-CM

## 2024-07-12 LAB
ABO/RH(D): NORMAL
ALBUMIN UR-MCNC: NEGATIVE MG/DL
AMORPH CRY #/AREA URNS HPF: ABNORMAL /HPF
ANION GAP SERPL CALCULATED.3IONS-SCNC: 12 MMOL/L (ref 7–15)
ANTIBODY SCREEN: NEGATIVE
APPEARANCE UR: ABNORMAL
BASOPHILS # BLD AUTO: 0.1 10E3/UL (ref 0–0.2)
BASOPHILS NFR BLD AUTO: 1 %
BILIRUB UR QL STRIP: NEGATIVE
BUN SERPL-MCNC: 15.9 MG/DL (ref 6–20)
CALCIUM SERPL-MCNC: 9.1 MG/DL (ref 8.6–10)
CHLORIDE SERPL-SCNC: 105 MMOL/L (ref 98–107)
COLOR UR AUTO: ABNORMAL
CREAT SERPL-MCNC: 0.69 MG/DL (ref 0.51–0.95)
DEPRECATED HCO3 PLAS-SCNC: 23 MMOL/L (ref 22–29)
EGFRCR SERPLBLD CKD-EPI 2021: >90 ML/MIN/1.73M2
EOSINOPHIL # BLD AUTO: 0.2 10E3/UL (ref 0–0.7)
EOSINOPHIL NFR BLD AUTO: 3 %
ERYTHROCYTE [DISTWIDTH] IN BLOOD BY AUTOMATED COUNT: 12 % (ref 10–15)
GLUCOSE SERPL-MCNC: 96 MG/DL (ref 70–99)
GLUCOSE UR STRIP-MCNC: NEGATIVE MG/DL
HCG INTACT+B SERPL-ACNC: 18 MIU/ML
HCT VFR BLD AUTO: 37.7 % (ref 35–47)
HGB BLD-MCNC: 12.4 G/DL (ref 11.7–15.7)
HGB UR QL STRIP: ABNORMAL
HOLD SPECIMEN: NORMAL
HOLD SPECIMEN: NORMAL
IMM GRANULOCYTES # BLD: 0 10E3/UL
IMM GRANULOCYTES NFR BLD: 0 %
KETONES UR STRIP-MCNC: NEGATIVE MG/DL
LEUKOCYTE ESTERASE UR QL STRIP: NEGATIVE
LYMPHOCYTES # BLD AUTO: 3 10E3/UL (ref 0.8–5.3)
LYMPHOCYTES NFR BLD AUTO: 43 %
MCH RBC QN AUTO: 30.3 PG (ref 26.5–33)
MCHC RBC AUTO-ENTMCNC: 32.9 G/DL (ref 31.5–36.5)
MCV RBC AUTO: 92 FL (ref 78–100)
MONOCYTES # BLD AUTO: 0.5 10E3/UL (ref 0–1.3)
MONOCYTES NFR BLD AUTO: 7 %
MUCOUS THREADS #/AREA URNS LPF: PRESENT /LPF
NEUTROPHILS # BLD AUTO: 3.3 10E3/UL (ref 1.6–8.3)
NEUTROPHILS NFR BLD AUTO: 47 %
NITRATE UR QL: NEGATIVE
NRBC # BLD AUTO: 0 10E3/UL
NRBC BLD AUTO-RTO: 0 /100
PH UR STRIP: 6.5 [PH] (ref 5–7)
PLATELET # BLD AUTO: 257 10E3/UL (ref 150–450)
POTASSIUM SERPL-SCNC: 3.8 MMOL/L (ref 3.4–5.3)
RBC # BLD AUTO: 4.09 10E6/UL (ref 3.8–5.2)
RBC URINE: 1 /HPF
SODIUM SERPL-SCNC: 140 MMOL/L (ref 135–145)
SP GR UR STRIP: 1.02 (ref 1–1.03)
SPECIMEN EXPIRATION DATE: NORMAL
SQUAMOUS EPITHELIAL: 5 /HPF
UROBILINOGEN UR STRIP-MCNC: NORMAL MG/DL
WBC # BLD AUTO: 7 10E3/UL (ref 4–11)
WBC URINE: 4 /HPF

## 2024-07-12 PROCEDURE — 99284 EMERGENCY DEPT VISIT MOD MDM: CPT | Mod: 25

## 2024-07-12 PROCEDURE — 36415 COLL VENOUS BLD VENIPUNCTURE: CPT | Performed by: EMERGENCY MEDICINE

## 2024-07-12 PROCEDURE — 84702 CHORIONIC GONADOTROPIN TEST: CPT | Performed by: EMERGENCY MEDICINE

## 2024-07-12 PROCEDURE — 85025 COMPLETE CBC W/AUTO DIFF WBC: CPT | Performed by: EMERGENCY MEDICINE

## 2024-07-12 PROCEDURE — 80048 BASIC METABOLIC PNL TOTAL CA: CPT | Performed by: EMERGENCY MEDICINE

## 2024-07-12 PROCEDURE — 81001 URINALYSIS AUTO W/SCOPE: CPT | Performed by: EMERGENCY MEDICINE

## 2024-07-12 PROCEDURE — 76801 OB US < 14 WKS SINGLE FETUS: CPT

## 2024-07-12 PROCEDURE — 250N000013 HC RX MED GY IP 250 OP 250 PS 637: Performed by: EMERGENCY MEDICINE

## 2024-07-12 PROCEDURE — 86900 BLOOD TYPING SEROLOGIC ABO: CPT | Performed by: EMERGENCY MEDICINE

## 2024-07-12 RX ORDER — ACETAMINOPHEN 500 MG
1000 TABLET ORAL ONCE
Status: COMPLETED | OUTPATIENT
Start: 2024-07-12 | End: 2024-07-12

## 2024-07-12 RX ADMIN — ACETAMINOPHEN 1000 MG: 500 TABLET, FILM COATED ORAL at 04:44

## 2024-07-12 ASSESSMENT — COLUMBIA-SUICIDE SEVERITY RATING SCALE - C-SSRS
2. HAVE YOU ACTUALLY HAD ANY THOUGHTS OF KILLING YOURSELF IN THE PAST MONTH?: NO
1. IN THE PAST MONTH, HAVE YOU WISHED YOU WERE DEAD OR WISHED YOU COULD GO TO SLEEP AND NOT WAKE UP?: NO
6. HAVE YOU EVER DONE ANYTHING, STARTED TO DO ANYTHING, OR PREPARED TO DO ANYTHING TO END YOUR LIFE?: NO

## 2024-07-12 ASSESSMENT — ACTIVITIES OF DAILY LIVING (ADL): ADLS_ACUITY_SCORE: 35

## 2024-07-12 NOTE — ED TRIAGE NOTES
Pt arrives with a few days of vaginal bleeding, more of a light pink that turned the toilet bowl pink. Also LBP and mild abdominal cramping. This is her second pregnancy. Positive home preg test 6 days ago and she believes she is 5-6 weeks pregnant. VSS, A&Ox4     Triage Assessment (Adult)       Row Name 07/12/24 0244          Triage Assessment    Airway WDL WDL        Skin Circulation/Temperature WDL    Skin Circulation/Temperature WDL WDL        Cardiac WDL    Cardiac WDL WDL        Peripheral/Neurovascular WDL    Peripheral Neurovascular WDL WDL        Cognitive/Neuro/Behavioral WDL    Cognitive/Neuro/Behavioral WDL WDL

## 2024-07-12 NOTE — ED PROVIDER NOTES
"  Emergency Department Note      History of Present Illness     Chief Complaint   Vaginal Bleeding - Pregnant    HPI   Nilsa Lee is a 29 year old  female who presents to the emergency department for vaginal bleeding in pregnancy. The patient states that this morning, she awoke and began experiencing vaginal bleeding. She adds that this vaginal bleeding was pink in color. She notes that she has bled through less than 1 pad. She reports that she is also experiencing \"intense\" lower abdominal cramping as well as urinary frequency. She states that she is currently approximately 5-6 weeks gestation. Denies fever or chills. Denies dysuria. Denies nausea, vomiting, diarrhea. Denies vaginal discharge. Denies concern for STD.    Independent Historian   None    Review of External Notes   none    Past Medical History     Medical History and Problem List   LGSIL    Medications   acetaminophen (TYLENOL) 325 MG tablet  diclofenac (VOLTAREN) 75 MG EC tablet  methocarbamol (ROBAXIN) 750 MG tablet    Surgical History     [place] tooth extraction    Physical Exam     Patient Vitals for the past 24 hrs:   BP Temp Temp src Pulse Resp SpO2 Weight   24 0243 123/87 97.5  F (36.4  C) Temporal 74 18 100 % 64.9 kg (143 lb 1.3 oz)     Physical Exam  Nursing note and vitals reviewed.  Constitutional: Well nourished. Resting comfortably.   Eyes: Conjunctiva normal.  Pupils are equal, round, and reactive to light.   ENT: Nose normal. Mucous membranes pink and moist.    Neck: Normal range of motion.  CVS: Normal rate, regular rhythm.  Normal heart sounds.    Pulmonary: Lungs clear to auscultation bilaterally. No wheezes/rales/rhonchi.  GI: Abdomen soft. Nontender, nondistended. No rigidity or guarding.  No CVA tenderness  Pelvic: deferred  MSK: Moves all extremities  Neuro: Alert. Follows simple commands.  Skin: Skin is warm and dry. No rash noted.   Psychiatric: Normal affect.       Diagnostics     Lab Results   Labs " Ordered and Resulted from Time of ED Arrival to Time of ED Departure   ROUTINE UA WITH MICROSCOPIC REFLEX TO CULTURE - Abnormal       Result Value    Color Urine Light Yellow      Appearance Urine Slightly Cloudy (*)     Glucose Urine Negative      Bilirubin Urine Negative      Ketones Urine Negative      Specific Gravity Urine 1.016      Blood Urine Small (*)     pH Urine 6.5      Protein Albumin Urine Negative      Urobilinogen Urine Normal      Nitrite Urine Negative      Leukocyte Esterase Urine Negative      Mucus Urine Present (*)     Amorphous Crystals Urine Few (*)     RBC Urine 1      WBC Urine 4      Squamous Epithelials Urine 5 (*)    HCG QUANTITATIVE PREGNANCY - Abnormal    hCG Quantitative 18 (*)    BASIC METABOLIC PANEL - Normal    Sodium 140      Potassium 3.8      Chloride 105      Carbon Dioxide (CO2) 23      Anion Gap 12      Urea Nitrogen 15.9      Creatinine 0.69      GFR Estimate >90      Calcium 9.1      Glucose 96     CBC WITH PLATELETS AND DIFFERENTIAL    WBC Count 7.0      RBC Count 4.09      Hemoglobin 12.4      Hematocrit 37.7      MCV 92      MCH 30.3      MCHC 32.9      RDW 12.0      Platelet Count 257      % Neutrophils 47      % Lymphocytes 43      % Monocytes 7      % Eosinophils 3      % Basophils 1      % Immature Granulocytes 0      NRBCs per 100 WBC 0      Absolute Neutrophils 3.3      Absolute Lymphocytes 3.0      Absolute Monocytes 0.5      Absolute Eosinophils 0.2      Absolute Basophils 0.1      Absolute Immature Granulocytes 0.0      Absolute NRBCs 0.0     TYPE AND SCREEN, ADULT    ABO/RH(D) A NEG      Antibody Screen Negative      SPECIMEN EXPIRATION DATE [id number]     ABO/RH TYPE AND SCREEN     Imaging   US OB <14 Weeks W Transvaginal   Final Result   IMPRESSION:    No evidence for intrauterine or extrauterine gestation. Note that ultrasound cannot exclude an ectopic gestation. Clinical and ultrasound follow-up recommended.              Independent Interpretation    None    ED Course      Medications Administered   Medications   acetaminophen (TYLENOL) tablet 1,000 mg (1,000 mg Oral $Given 7/12/24 9759)     Discussion of Management   None    ED Course   ED Course as of 07/12/24 7300   Fri Jul 12, 2024   3477 I obtained history and examined the patient as noted above.        Optional/Additional Documentation  None    Medical Decision Making / Diagnosis     CMS Diagnoses: None    MIPS   None    MDM   Nilsa Lee is a 29 year old female who presents for evaluation of vaginal bleeding in pregnancy, see HPI.  The workup here shows threatened miscarriage.   I considered a broad differential including ovarian cyst, UTI, pyelonephritis, subchorionic hemorrhage, uterine bleeding, active miscarriage, constipation, etc.  More rare but serious etiologies considered included ectopic pregnancy, appendicitis, cholecystitis, volvulus, intraabdominal abscess, heterotopic pregnancy, etc.  In this patient, there are no signs of serious etiologies of abdominal pain. BHCG minimally elevated and this alongside the ultrasound findings we discussed cannot exclude findings in early pregnancy vs ectopic pregnancy. H/H stable, she denies any heavy active bleeding.Not a RHOGAM candidate given <12 weeks. Supportive outpatient management is therefore indicated.  Plan is home, close follow-up with OB, threatened miscarriage precautions, and return to ED for worsening pain, heavy vaginal bleeding (more than 1 pad soaked every hour).  Questions were answered.      Disposition   The patient was discharged.     Diagnosis     ICD-10-CM    1. Vaginal bleeding in pregnancy  O46.90            Discharge Medications   New Prescriptions    No medications on file     Scribe Disclosure:  Jose WOODY, am serving as a scribe at 4:42 AM on 7/12/2024 to document services personally performed by Nilsa Wharton, DO based on my observations and the provider's statements to me.        Nilsa Wharton,  DO  07/12/24 0736

## 2025-01-19 ENCOUNTER — NURSE TRIAGE (OUTPATIENT)
Dept: NURSING | Facility: CLINIC | Age: 30
End: 2025-01-19
Payer: OTHER GOVERNMENT

## 2025-01-19 NOTE — TELEPHONE ENCOUNTER
"Nurse Triage SBAR    Is this a 2nd Level Triage? NO    Situation: Patient calling.     Background: Nausea    Assessment: Patient is 8 weeks pregnant. She has not seen an OB provider.  She has not vomited, but has had nausea for the past two weeks and feels that she may be dehydrated.     Protocol Recommended Disposition:   Go to ED Now (Or PCP Triage)    Recommendation: Unable to 2LT as pt not established. Go to ED.  Pt stated she will go to ED near her home.          Does the patient meet one of the following criteria for ADS visit consideration? No    Reason for Disposition   [1] Drinking very little AND [2] dehydration suspected (e.g., no urine > 12 hours, very dry mouth, very lightheaded)    Additional Information   Negative: Sounds like a life-threatening emergency to the triager   Negative: [1] Vomiting AND [2] contains red blood or black (\"coffee ground\") material  (Exception: Few red streaks in vomit that only happened once.)   Negative: [1] Insulin-dependent diabetes (Type I) AND [2] glucose > 400 mg/dl (22 mmol/l)   Negative: Recent head injury (within last 3 days)   Negative: Recent abdominal injury (within last 3 days)   Negative: Severe pain in one eye   Negative: [1] SEVERE vomiting (e.g., 6 or more times / day) AND [2] present > 8 hours    Protocols used: Pregnancy - Morning Sickness (Nausea and Vomiting of Pregnancy)-A-    "

## 2025-06-22 ENCOUNTER — HOSPITAL ENCOUNTER (OUTPATIENT)
Facility: CLINIC | Age: 30
End: 2025-06-22
Admitting: OBSTETRICS & GYNECOLOGY
Payer: COMMERCIAL

## 2025-06-22 ENCOUNTER — HOSPITAL ENCOUNTER (OUTPATIENT)
Facility: CLINIC | Age: 30
Discharge: HOME | End: 2025-06-22
Attending: OBSTETRICS & GYNECOLOGY | Admitting: OBSTETRICS & GYNECOLOGY
Payer: COMMERCIAL

## 2025-06-22 ENCOUNTER — APPOINTMENT (OUTPATIENT)
Dept: ULTRASOUND IMAGING | Facility: CLINIC | Age: 30
End: 2025-06-22
Attending: OBSTETRICS & GYNECOLOGY
Payer: COMMERCIAL

## 2025-06-22 VITALS — RESPIRATION RATE: 16 BRPM | DIASTOLIC BLOOD PRESSURE: 69 MMHG | TEMPERATURE: 97.7 F | SYSTOLIC BLOOD PRESSURE: 108 MMHG

## 2025-06-22 LAB
APTT PPP: 24 SECONDS (ref 22–38)
ERYTHROCYTE [DISTWIDTH] IN BLOOD BY AUTOMATED COUNT: 12.8 % (ref 10–15)
FIBRINOGEN PPP-MCNC: 438 MG/DL (ref 170–510)
HCT VFR BLD AUTO: 30.8 % (ref 35–47)
HGB BLD-MCNC: 10.4 G/DL (ref 11.7–15.7)
HOLD SPECIMEN: NORMAL
INR PPP: 0.94 (ref 0.85–1.15)
MCH RBC QN AUTO: 29.8 PG (ref 26.5–33)
MCHC RBC AUTO-ENTMCNC: 33.8 G/DL (ref 31.5–36.5)
MCV RBC AUTO: 88 FL (ref 78–100)
PLATELET # BLD AUTO: 263 10E3/UL (ref 150–450)
PROTHROMBIN TIME: 12.7 SECONDS (ref 11.8–14.8)
RBC # BLD AUTO: 3.49 10E6/UL (ref 3.8–5.2)
WBC # BLD AUTO: 10 10E3/UL (ref 4–11)

## 2025-06-22 PROCEDURE — 85384 FIBRINOGEN ACTIVITY: CPT | Performed by: OBSTETRICS & GYNECOLOGY

## 2025-06-22 PROCEDURE — 76819 FETAL BIOPHYS PROFIL W/O NST: CPT

## 2025-06-22 PROCEDURE — 96360 HYDRATION IV INFUSION INIT: CPT

## 2025-06-22 PROCEDURE — 85730 THROMBOPLASTIN TIME PARTIAL: CPT | Performed by: OBSTETRICS & GYNECOLOGY

## 2025-06-22 PROCEDURE — 85610 PROTHROMBIN TIME: CPT | Performed by: OBSTETRICS & GYNECOLOGY

## 2025-06-22 PROCEDURE — 250N000011 HC RX IP 250 OP 636: Performed by: OBSTETRICS & GYNECOLOGY

## 2025-06-22 PROCEDURE — 36415 COLL VENOUS BLD VENIPUNCTURE: CPT | Performed by: OBSTETRICS & GYNECOLOGY

## 2025-06-22 PROCEDURE — 85027 COMPLETE CBC AUTOMATED: CPT | Performed by: OBSTETRICS & GYNECOLOGY

## 2025-06-22 PROCEDURE — 85460 HEMOGLOBIN FETAL: CPT | Performed by: OBSTETRICS & GYNECOLOGY

## 2025-06-22 PROCEDURE — G0463 HOSPITAL OUTPT CLINIC VISIT: HCPCS | Mod: 25

## 2025-06-22 PROCEDURE — 59025 FETAL NON-STRESS TEST: CPT

## 2025-06-22 RX ORDER — PRENATAL VIT/IRON FUM/FOLIC AC 27MG-0.8MG
1 TABLET ORAL DAILY
COMMUNITY

## 2025-06-22 RX ORDER — DEXTROSE, SODIUM CHLORIDE, SODIUM LACTATE, POTASSIUM CHLORIDE, AND CALCIUM CHLORIDE 5; .6; .31; .03; .02 G/100ML; G/100ML; G/100ML; G/100ML; G/100ML
INJECTION, SOLUTION INTRAVENOUS ONCE
Status: COMPLETED | OUTPATIENT
Start: 2025-06-22 | End: 2025-06-22

## 2025-06-22 RX ORDER — SERTRALINE HYDROCHLORIDE 25 MG/1
25 TABLET, FILM COATED ORAL DAILY
COMMUNITY

## 2025-06-22 RX ADMIN — DEXTROSE, SODIUM CHLORIDE, SODIUM LACTATE, POTASSIUM CHLORIDE, AND CALCIUM CHLORIDE: 5; .6; .31; .03; .02 INJECTION, SOLUTION INTRAVENOUS at 19:50

## 2025-06-22 ASSESSMENT — ACTIVITIES OF DAILY LIVING (ADL)
ADLS_ACUITY_SCORE: 16

## 2025-06-22 NOTE — PROVIDER NOTIFICATION
25 1713   Provider Notification   Provider Name/Title Dr. Valdes   Method of Notification Phone   Request Evaluate - Remote   Notification Reason Status Update     MD returned page, Updated MD on pt arrival. 29yo 30w0d . [facility] transfer of care for gestational age, CHRISTINE patient. Pt reports see triage note. Pt denies ROM or leaking fluid. Pt denies chest pain, SOB, HA, vision changes, and swelling. Prenatal history is complicated by previous C/S and pregnancy anemia. Pt had rhogam injection in last week, A-. Fetal movement present per pt.        FHR tracing has WDL baseline, variability moderate, accels present, decels absent. Contractions not present, pt reports some cramping but not in pain. Abdomen palpates soft.     MD ordered CBC, Coag panel, KB, BPP for suspected abruption.     JOSÉ MIGUEL

## 2025-06-22 NOTE — CARE PLAN
"Data: [facility] presented to [facility]: 2025  4:43 PM.  Reason for maternal/fetal assessment is vaginal bleeding. Pt was called in by MD at [facility], pt is currently 30w0d. Patient reports going to void, blood in toilet and on TP when wiping bright red blood and described as \"a heavy menstrual bleed\", no bleeding present currently. Pt denies any known placental complications or intercourse within the last 24 hours.   Patient is a .  Prenatal record requested from [facility]. Pregnancy  has been complicated by  has been complicated by previous C/S and anemia.  Gestational Age 30w0d. VSS. Fetal movement present. Patient denies uterine contractions, leaking of vaginal fluid/rupture of membranes, abdominal pain, pelvic pressure, nausea, vomiting, headache, visual disturbances, epigastric or RUQ pain, significant edema. Support person is present.   Action: Verbal consent for EFM. Triage assessment completed. Bill of rights reviewed.  Response: Patient verbalized agreement with plan. Will contact Dr Jodee Valdes with update and further orders.   "

## 2025-06-23 LAB
FETAL RBC % LFV: 0 %
FETAL RBC (ML): 0 ML
IF INDICATED RECOMMENDED DOSE OF RH IMMUNE GLOBULIN UG: 300 UG (ref ?–300)

## 2025-06-23 NOTE — PROVIDER NOTIFICATION
06/22/25 1935   Provider Notification   Provider Name/Title Dr. Valdes   Method of Notification In Department     MD updated on patient status. Lab results WDL, no reports or visualization of continued bleeding in triage, BPP 8/8 and FHR category 1. [place] picking up uterine irritability & patient reports feeling occasional tightness & cramping. Orders received for 1 liter bolus D5LR and if cat 1 and no cx, orders to discharge home.

## 2025-06-23 NOTE — DISCHARGE INSTRUCTIONS
Learning About When to Call Your Doctor During Pregnancy (After 20 Weeks)  Overview  It's common to have concerns about what might be a problem when you're pregnant. Most pregnancies don't have any serious problems. But it's still important to know when to call your doctor if you have certain symptoms or signs of labor.  These are general suggestions. Your doctor may give you some more information about when to call.  When to call your doctor (after 20 weeks)  Call 911 anytime you think you may need emergency care. For example, call if:  You have severe vaginal bleeding. You have soaked through one or more pads in an hour, and the bleeding is not slowing down.  You have sudden, severe pain in your belly that does not go away.  You have chest pain, are short of breath, or cough up blood.  You passed out (lost consciousness).  You have a seizure.  You see or feel the umbilical cord.  You think you are about to deliver your baby and can't make it safely to the hospital or birthing center.  Call your doctor now or seek immediate medical care if:  You have vaginal bleeding.  You have belly pain.  You have a fever.  You are dizzy or lightheaded, or you feel like you may faint.  You have signs of a blood clot in your leg (called a deep vein thrombosis), such as:  Pain in the calf, back of the knee, thigh, or groin.  Swelling in your leg or groin.  A color change on the leg or groin. The skin may be reddish or purplish.  You have symptoms of preeclampsia, such as:  Sudden swelling of your face, hands, or feet.  New vision problems (such as dimness, blurring, or seeing spots).  A severe headache that will not go away.  You have a sudden release or slow trickle of fluid from your vagina. This may mean your water has broken.  You've been having regular contractions for an hour at less than 37 weeks. This means that you've had at least 6 contractions within 1 hour, even after you change your position and drink fluids.  You  "notice that your baby has stopped moving or is moving less than normal.  You have signs of heart failure, such as:  New or increased shortness of breath.  New or worse swelling in your legs, ankles, or feet.  Sudden weight gain, such as more than 2 to 3 pounds in a day or 5 pounds in a week.  Feeling so tired or weak that you cannot do your usual activities.  You have symptoms of a urinary tract infection. These may include:  Pain or burning when you urinate.  A frequent need to urinate without being able to pass much urine.  Pain in your low back (below the rib cage and above the waist).  Blood in your urine.  Watch closely for changes in your health, and be sure to contact your doctor if:  You have vaginal discharge that smells bad.  You feel sad, anxious, or hopeless for more than a few days.  You have skin changes, such as a rash, itching, or a yellow color to your skin.  You have other concerns about your pregnancy.  If you have labor signs at 37 weeks or more  If you have signs of labor at 37 weeks or more, your doctor may tell you to call when your labor becomes more active. During active labor:  Contractions happen more often and at regular intervals (about every 3 to 5 minutes).  Contractions last longer (about 60 seconds or more).  Contractions get stronger and are hard to talk through.  Follow-up care is a key part of your treatment and safety. Be sure to make and go to all appointments, and call your doctor if you are having problems. It's also a good idea to know your test results and keep a list of the medicines you take.  Where can you learn more?  Go to https://www.[employer].net/patiented  Enter N531 in the search box to learn more about \"Learning About When to Call Your Doctor During Pregnancy (After 20 Weeks).\"  Current as of: April 30, 2024  Content Version: 14.5    3406-9851 [facility].   Care instructions adapted under license by your healthcare professional. If you have questions " about a medical condition or this instruction, always ask your healthcare professional. [email or web address] disclaims any warranty or liability for your use of this information.

## 2025-06-23 NOTE — PLAN OF CARE
Data: Patient assessed in the [facility] for vaginal bleeding.  Cervical exam not examined.  Membranes intact.  Contractions none upon discharge.  Action:  Presumed adequate fetal oxygenation documented (see flow record). Discharge instructions reviewed.  Patient instructed to report change in fetal movement, vaginal leaking of fluid or bleeding, abdominal pain, or any concerns related to the pregnancy to her nurse/physician.    Response: Orders to discharge home per Jodee Valdes.  Patient verbalized understanding of education and verbalized agreement with plan. Discharged to home at 2110.

## 2025-06-23 NOTE — PROGRESS NOTES
"Triage Assessment Note:    S: Pt is a 31 yo  at 30+0/7 wks. She receives her primary ob care at [place]. Today she had a sudden \"gush\" of blood and more blood after wiping. Pt contacted her primary clinic. Due to their restriction on delivery age, she was sent to [facility].     Pt denies any strenuous activity, knowledge of previa, trauma. She is known RH negative and has received her Rhogam in the past two weeks.     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 She is wondering if this could be contributing to her symptoms and if she should return to work.     O:  /69   Temp 97.7  F (36.5  C) (Oral)   Resp 16   LMP 2024 (Exact Date)   Vaginal exam (per nursing): no blood or blood staining or discharge present.   Abdomen: soft and non-tender  Extremities: Soft and non-tender without cords  NST: Reactive  TOCO: no regular contractions, possible irritability  KB: Pending  Fibrinogen: Normal range  PTT: Normal range  INR: 0.94  CBC: Hgb 10.4, WBC 10  BPP: 8/8, Vertex, MVP: 4.8 cm    A/P: 31 yo  at 30+0/7 wks. Bleeding earlier today.   Current evaluation is reassuring without concern. Discussed monitoring for s/s and if further bleeding to return. Will give 1 L D5LR and reassess.   Pt has concerns if she should return to work. At this time we offered for her to discuss with primary OB for possible early application for FMLA vs. If they consider her for disability. At this time we have no indication for disability.   If no further bleeding or concerning maternal or fetal indication pt is okay to discharge with follow-up with primary clinic in 24-48 hours.     Jodee Valdes MD              "

## 2025-07-07 ENCOUNTER — HOSPITAL ENCOUNTER (OUTPATIENT)
Age: 30
Discharge: HOME | End: 2025-07-07
Payer: OTHER GOVERNMENT

## 2025-07-07 DIAGNOSIS — O36.63X0: ICD-10-CM

## 2025-07-07 DIAGNOSIS — Z3A.32: ICD-10-CM

## 2025-07-07 DIAGNOSIS — O46.93: Primary | ICD-10-CM

## 2025-07-07 PROCEDURE — 76816 OB US FOLLOW-UP PER FETUS: CPT

## (undated) DEVICE — PACK C-SECTION LF PL15OTA83B

## (undated) DEVICE — SU VICRYL 3-0 CTX 36" UND J980H

## (undated) DEVICE — SU MONOCRYL 0 CT-1 36" Y346H

## (undated) DEVICE — CATH TRAY FOLEY 16FR BARDEX W/DRAIN BAG STATLOCK 300316A

## (undated) DEVICE — DRSG STERI STRIP 1/2X4" R1547

## (undated) DEVICE — SU MONOCRYL 4-0 PS-2 18" UND Y496G

## (undated) DEVICE — PREP CHLORAPREP 26ML TINTED ORANGE  260815

## (undated) DEVICE — SU VICRYL 0 CT-1 36" J346H

## (undated) DEVICE — SOL WATER IRRIG 1000ML BOTTLE 07139-09

## (undated) DEVICE — SOL NACL 0.9% IRRIG 1000ML BOTTLE 07138-09

## (undated) DEVICE — GLOVE PROTEXIS BLUE W/NEU-THERA 6.5  2D73EB65

## (undated) DEVICE — STRAP KNEE/BODY 31143004

## (undated) DEVICE — GLOVE ESTEEM POWDER FREE SMT 6.5  2D72PT65

## (undated) DEVICE — STOCKING SLEEVE COMPRESSION CALF LG

## (undated) RX ORDER — MORPHINE SULFATE 1 MG/ML
INJECTION, SOLUTION EPIDURAL; INTRATHECAL; INTRAVENOUS
Status: DISPENSED
Start: 2021-12-27

## (undated) RX ORDER — OXYTOCIN/0.9 % SODIUM CHLORIDE 30/500 ML
PLASTIC BAG, INJECTION (ML) INTRAVENOUS
Status: DISPENSED
Start: 2021-12-27

## (undated) RX ORDER — FENTANYL CITRATE 50 UG/ML
INJECTION, SOLUTION INTRAMUSCULAR; INTRAVENOUS
Status: DISPENSED
Start: 2021-12-27

## (undated) RX ORDER — IBUPROFEN 600 MG/1
TABLET, FILM COATED ORAL
Status: DISPENSED
Start: 2024-06-03

## (undated) RX ORDER — PREDNISONE 20 MG/1
TABLET ORAL
Status: DISPENSED
Start: 2024-06-03

## (undated) RX ORDER — LIDOCAINE 4 G/G
PATCH TOPICAL
Status: DISPENSED
Start: 2024-06-03